# Patient Record
Sex: FEMALE | Race: BLACK OR AFRICAN AMERICAN | NOT HISPANIC OR LATINO | Employment: FULL TIME | URBAN - METROPOLITAN AREA
[De-identification: names, ages, dates, MRNs, and addresses within clinical notes are randomized per-mention and may not be internally consistent; named-entity substitution may affect disease eponyms.]

---

## 2022-07-28 PROBLEM — N13.5 URETERAL STRICTURE, RIGHT: Chronic | Status: ACTIVE | Noted: 2022-07-28

## 2022-07-28 PROBLEM — N13.30 HYDRONEPHROSIS: Chronic | Status: ACTIVE | Noted: 2022-07-28

## 2023-04-19 ENCOUNTER — PATIENT MESSAGE (OUTPATIENT)
Dept: RESEARCH | Facility: HOSPITAL | Age: 48
End: 2023-04-19

## 2023-11-01 PROBLEM — S39.012A STRAIN OF LUMBAR REGION: Status: ACTIVE | Noted: 2023-11-01

## 2023-11-01 PROBLEM — K59.03 DRUG-INDUCED CONSTIPATION: Status: ACTIVE | Noted: 2023-11-01

## 2023-11-01 PROBLEM — R73.03 PREDIABETES: Status: ACTIVE | Noted: 2023-11-01

## 2023-11-01 PROBLEM — E03.9 ACQUIRED HYPOTHYROIDISM: Status: ACTIVE | Noted: 2023-11-01

## 2023-11-01 PROBLEM — D72.829 LEUKOCYTOSIS: Status: ACTIVE | Noted: 2023-11-01

## 2023-11-01 PROBLEM — I21.4 NSTEMI (NON-ST ELEVATED MYOCARDIAL INFARCTION) (MULTI): Status: ACTIVE | Noted: 2023-11-01

## 2023-11-01 PROBLEM — K59.09 CHRONIC CONSTIPATION: Status: ACTIVE | Noted: 2023-11-01

## 2023-11-01 PROBLEM — F41.0 PANIC ATTACK: Status: ACTIVE | Noted: 2023-11-01

## 2023-11-01 PROBLEM — G43.909 MIGRAINE: Status: ACTIVE | Noted: 2023-11-01

## 2023-11-01 PROBLEM — M54.42 LUMBAGO WITH SCIATICA, LEFT SIDE: Status: ACTIVE | Noted: 2023-11-01

## 2023-11-01 PROBLEM — K21.9 GASTROESOPHAGEAL REFLUX DISEASE WITHOUT ESOPHAGITIS: Status: ACTIVE | Noted: 2023-11-01

## 2023-11-01 PROBLEM — I10 BENIGN ESSENTIAL HYPERTENSION: Status: ACTIVE | Noted: 2023-11-01

## 2023-11-01 PROBLEM — M54.41 LUMBAGO WITH SCIATICA, RIGHT SIDE: Status: ACTIVE | Noted: 2023-11-01

## 2023-11-01 PROBLEM — F17.200 TOBACCO USE DISORDER: Status: ACTIVE | Noted: 2023-11-01

## 2023-11-01 PROBLEM — F43.23 ADJUSTMENT DISORDER WITH MIXED ANXIETY AND DEPRESSED MOOD: Status: ACTIVE | Noted: 2023-11-01

## 2023-11-01 PROBLEM — F41.8 DEPRESSION WITH ANXIETY: Status: ACTIVE | Noted: 2023-11-01

## 2023-11-01 PROBLEM — E78.2 MIXED HYPERLIPIDEMIA: Status: ACTIVE | Noted: 2023-11-01

## 2023-11-01 PROBLEM — G47.33 OSA (OBSTRUCTIVE SLEEP APNEA): Status: ACTIVE | Noted: 2023-11-01

## 2023-11-01 PROBLEM — I20.9 ANGINA PECTORIS (CMS-HCC): Status: ACTIVE | Noted: 2023-11-01

## 2023-11-01 PROBLEM — G89.29 OTHER CHRONIC PAIN: Status: ACTIVE | Noted: 2023-11-01

## 2023-11-01 PROBLEM — R13.10 DYSPHAGIA: Status: ACTIVE | Noted: 2023-11-01

## 2023-11-01 PROBLEM — E87.1 HYPONATREMIA: Status: ACTIVE | Noted: 2023-11-01

## 2023-11-01 PROBLEM — G43.109 MIGRAINE WITH AURA AND WITHOUT STATUS MIGRAINOSUS, NOT INTRACTABLE: Status: ACTIVE | Noted: 2023-11-01

## 2023-11-01 PROBLEM — I25.10 CAD IN NATIVE ARTERY: Status: ACTIVE | Noted: 2023-11-01

## 2023-11-01 PROBLEM — Z98.890 HISTORY OF DISCECTOMY: Status: ACTIVE | Noted: 2023-11-01

## 2023-11-01 PROBLEM — Z95.5 STENTED CORONARY ARTERY: Status: ACTIVE | Noted: 2023-11-01

## 2023-11-01 RX ORDER — TOPIRAMATE 100 MG/1
1 TABLET, FILM COATED ORAL NIGHTLY
COMMUNITY
Start: 2020-11-19

## 2023-11-01 RX ORDER — NITROGLYCERIN 0.4 MG/1
TABLET SUBLINGUAL
COMMUNITY
End: 2023-11-02 | Stop reason: WASHOUT

## 2023-11-01 RX ORDER — METFORMIN HYDROCHLORIDE 500 MG/1
1 TABLET ORAL
COMMUNITY

## 2023-11-01 RX ORDER — OXYCODONE AND ACETAMINOPHEN 7.5; 325 MG/1; MG/1
1 TABLET ORAL EVERY 6 HOURS PRN
COMMUNITY
End: 2024-01-19 | Stop reason: WASHOUT

## 2023-11-01 RX ORDER — ISOSORBIDE MONONITRATE 30 MG/1
1 TABLET, EXTENDED RELEASE ORAL DAILY
COMMUNITY
End: 2023-11-02 | Stop reason: WASHOUT

## 2023-11-01 RX ORDER — OMEPRAZOLE 20 MG/1
1 TABLET, ORALLY DISINTEGRATING, DELAYED RELEASE ORAL
COMMUNITY

## 2023-11-01 RX ORDER — ONDANSETRON HYDROCHLORIDE 8 MG/1
1 TABLET, FILM COATED ORAL 3 TIMES DAILY PRN
COMMUNITY
Start: 2016-02-22 | End: 2023-11-02 | Stop reason: WASHOUT

## 2023-11-01 RX ORDER — GABAPENTIN 800 MG/1
1 TABLET ORAL 3 TIMES DAILY
COMMUNITY
End: 2023-12-01 | Stop reason: SDUPTHER

## 2023-11-01 RX ORDER — METOPROLOL SUCCINATE 25 MG/1
100 TABLET, EXTENDED RELEASE ORAL 2 TIMES DAILY
COMMUNITY
Start: 2016-03-07

## 2023-11-01 RX ORDER — SUMATRIPTAN 50 MG/1
TABLET, FILM COATED ORAL
COMMUNITY
Start: 2016-03-07 | End: 2023-11-02 | Stop reason: WASHOUT

## 2023-11-01 RX ORDER — BUSPIRONE HYDROCHLORIDE 10 MG/1
2 TABLET ORAL 2 TIMES DAILY
COMMUNITY
End: 2023-12-29

## 2023-11-01 RX ORDER — ATORVASTATIN CALCIUM 80 MG/1
1 TABLET, FILM COATED ORAL DAILY
COMMUNITY
End: 2024-02-21

## 2023-11-01 RX ORDER — LEVOTHYROXINE SODIUM 125 UG/1
1 TABLET ORAL
COMMUNITY
End: 2024-01-31

## 2023-11-01 RX ORDER — LISINOPRIL 20 MG/1
0.5 TABLET ORAL DAILY
COMMUNITY
Start: 2020-06-11 | End: 2024-02-08 | Stop reason: WASHOUT

## 2023-11-01 RX ORDER — TIZANIDINE 4 MG/1
1 TABLET ORAL 3 TIMES DAILY PRN
COMMUNITY

## 2023-11-01 RX ORDER — CYCLOBENZAPRINE HCL 10 MG
.5-1 TABLET ORAL NIGHTLY PRN
COMMUNITY
Start: 2016-02-22 | End: 2023-11-02 | Stop reason: WASHOUT

## 2023-11-02 ENCOUNTER — OFFICE VISIT (OUTPATIENT)
Dept: PRIMARY CARE | Facility: CLINIC | Age: 48
End: 2023-11-02
Payer: COMMERCIAL

## 2023-11-02 VITALS
BODY MASS INDEX: 35.7 KG/M2 | WEIGHT: 263.6 LBS | SYSTOLIC BLOOD PRESSURE: 126 MMHG | HEART RATE: 87 BPM | DIASTOLIC BLOOD PRESSURE: 80 MMHG | HEIGHT: 72 IN | OXYGEN SATURATION: 96 %

## 2023-11-02 DIAGNOSIS — B02.9 HERPES ZOSTER WITHOUT COMPLICATION: ICD-10-CM

## 2023-11-02 DIAGNOSIS — F41.9 ANXIETY: Primary | ICD-10-CM

## 2023-11-02 PROCEDURE — 3079F DIAST BP 80-89 MM HG: CPT | Performed by: STUDENT IN AN ORGANIZED HEALTH CARE EDUCATION/TRAINING PROGRAM

## 2023-11-02 PROCEDURE — 99214 OFFICE O/P EST MOD 30 MIN: CPT | Performed by: STUDENT IN AN ORGANIZED HEALTH CARE EDUCATION/TRAINING PROGRAM

## 2023-11-02 PROCEDURE — 4004F PT TOBACCO SCREEN RCVD TLK: CPT | Performed by: STUDENT IN AN ORGANIZED HEALTH CARE EDUCATION/TRAINING PROGRAM

## 2023-11-02 PROCEDURE — 99214 OFFICE O/P EST MOD 30 MIN: CPT | Mod: GC | Performed by: STUDENT IN AN ORGANIZED HEALTH CARE EDUCATION/TRAINING PROGRAM

## 2023-11-02 PROCEDURE — 3074F SYST BP LT 130 MM HG: CPT | Performed by: STUDENT IN AN ORGANIZED HEALTH CARE EDUCATION/TRAINING PROGRAM

## 2023-11-02 RX ORDER — TRAMADOL HYDROCHLORIDE 50 MG/1
50 TABLET ORAL EVERY 4 HOURS PRN
COMMUNITY
Start: 2020-07-08 | End: 2023-11-02 | Stop reason: WASHOUT

## 2023-11-02 RX ORDER — ESCITALOPRAM OXALATE 10 MG/1
10 TABLET ORAL DAILY
Qty: 30 TABLET | Refills: 5 | Status: SHIPPED | OUTPATIENT
Start: 2023-11-02 | End: 2023-11-02 | Stop reason: SDUPTHER

## 2023-11-02 RX ORDER — TICAGRELOR 90 MG/1
90 TABLET ORAL 2 TIMES DAILY
COMMUNITY
Start: 2023-01-13 | End: 2023-04-13

## 2023-11-02 RX ORDER — METHOCARBAMOL 750 MG/1
750 TABLET, FILM COATED ORAL 3 TIMES DAILY
COMMUNITY
Start: 2023-10-20 | End: 2024-02-08 | Stop reason: WASHOUT

## 2023-11-02 RX ORDER — VALACYCLOVIR HYDROCHLORIDE 1 G/1
1000 TABLET, FILM COATED ORAL 3 TIMES DAILY
Qty: 21 TABLET | Refills: 0 | Status: SHIPPED | OUTPATIENT
Start: 2023-11-02 | End: 2023-11-09

## 2023-11-02 RX ORDER — TRAMADOL HYDROCHLORIDE 100 MG/1
100 TABLET, COATED ORAL EVERY 4 HOURS PRN
COMMUNITY
End: 2023-11-02 | Stop reason: WASHOUT

## 2023-11-02 RX ORDER — ESCITALOPRAM OXALATE 10 MG/1
10 TABLET ORAL DAILY
Qty: 30 TABLET | Refills: 1 | Status: SHIPPED | OUTPATIENT
Start: 2023-11-02 | End: 2023-11-24

## 2023-11-02 RX ORDER — LISINOPRIL AND HYDROCHLOROTHIAZIDE 12.5; 2 MG/1; MG/1
1 TABLET ORAL DAILY
COMMUNITY
Start: 2023-09-18 | End: 2024-03-18 | Stop reason: SDUPTHER

## 2023-11-02 RX ORDER — HYDROXYZINE HYDROCHLORIDE 25 MG/1
25 TABLET, FILM COATED ORAL 3 TIMES DAILY
Qty: 90 TABLET | Refills: 0 | Status: SHIPPED | OUTPATIENT
Start: 2023-11-02 | End: 2023-11-24

## 2023-11-02 ASSESSMENT — PATIENT HEALTH QUESTIONNAIRE - PHQ9
1. LITTLE INTEREST OR PLEASURE IN DOING THINGS: NOT AT ALL
2. FEELING DOWN, DEPRESSED OR HOPELESS: NOT AT ALL
SUM OF ALL RESPONSES TO PHQ9 QUESTIONS 1 AND 2: 0

## 2023-11-02 ASSESSMENT — COLUMBIA-SUICIDE SEVERITY RATING SCALE - C-SSRS
2. HAVE YOU ACTUALLY HAD ANY THOUGHTS OF KILLING YOURSELF?: NO
1. IN THE PAST MONTH, HAVE YOU WISHED YOU WERE DEAD OR WISHED YOU COULD GO TO SLEEP AND NOT WAKE UP?: NO
6. HAVE YOU EVER DONE ANYTHING, STARTED TO DO ANYTHING, OR PREPARED TO DO ANYTHING TO END YOUR LIFE?: NO

## 2023-11-02 ASSESSMENT — PAIN SCALES - GENERAL: PAINLEVEL: 10-WORST PAIN EVER

## 2023-11-02 NOTE — PROGRESS NOTES
Subjective   Patient ID: Janae Roman is a 48 y.o. female who presents for Rash (Pt is here for a rash and pain, on her right shoulder blade, which started on Oct 20th. ) and Anxiety (Pt would like to get a quick acting anxiety medication ).    HPI  47 yo female here for shoulder anil and anxiety  Rash, pain on R posterior shoulder  Went to ED, there was no rash at that time  Shoulder was painful  Thought to be muscular pain  2. Anxiety  Has been going on for a while  Seems like it's getting worse lately  Is going through menopause    Review of Systems  All pertinent positive symptoms are included in the history of present illness.    All other systems have been reviewed and are negative and noncontributory to this patient's current ailments.     Allergies   Allergen Reactions    Corticosteroids (Glucocorticoids) Hives, Itching and Shortness of breath     Pt does not know which steroid she was taking.    Penicillins Shortness of breath    Hydrocodone-Acetaminophen Hives    Levofloxacin Unknown    Methylprednisolone Unknown    Morphine Hives    Promethazine Hives    Vancomycin Hcl Hives        Immunization History   Administered Date(s) Administered    Pf[employer] Purple Cap SARS-CoV-2 06/30/2021       Objective   Vitals:    11/02/23 0933   BP: 126/80   BP Location: Left arm   Patient Position: Sitting   BP Cuff Size: Large adult   Pulse: 87   SpO2: 96%   Weight: 120 kg (263 lb 9.6 oz)   Height: 1.829 m (6')       Physical Exam  CONSTITUTIONAL - cane use, well nourished, well developed, looks like stated age, in no acute distress, not ill-appearing, and not tired appearing  SKIN - R posterior shoulder: Erythematous .2cm lesions that do not cross midline  HEAD - no trauma, normocephalic  EYES - extraocular muscles are intact, and normal external exam  ENT - atraumatic  NECK - supple without rigidity, no neck mass was observed  CHEST - clear to auscultation, no wheezing, no crackles and no rales, good effort  CARDIAC -  regular rate and regular rhythm, no skipped beats, no murmur  ABDOMEN - no organomegaly, soft, nontender, nondistended, no guarding/rebound/rigidity, negative McBurney sign and negative Chan sign  EXTREMITIES - no edema, no deformities  NEUROLOGICAL - alert, oriented and no focal signs  PSYCHIATRIC - alert, pleasant and cordial, age-appropriate  IMMUNOLOGIC - no cervical lymphadenopathy     Assessment/Plan   47 yo female here for shoulder anil and anxiety  Shingles  ED note reviewed  Start Valacyclovir  2. Anxiety  Start escitalopram 10 mg daily  Start Hydroxyzine as needed    RTC in 1 month for anxiety follow up

## 2023-11-24 DIAGNOSIS — F41.9 ANXIETY: ICD-10-CM

## 2023-11-24 RX ORDER — ESCITALOPRAM OXALATE 10 MG/1
10 TABLET ORAL DAILY
Qty: 90 TABLET | Refills: 0 | Status: SHIPPED | OUTPATIENT
Start: 2023-11-24 | End: 2024-02-08 | Stop reason: WASHOUT

## 2023-11-24 RX ORDER — HYDROXYZINE HYDROCHLORIDE 25 MG/1
25 TABLET, FILM COATED ORAL 3 TIMES DAILY
Qty: 270 TABLET | Refills: 0 | Status: SHIPPED | OUTPATIENT
Start: 2023-11-24 | End: 2024-02-08 | Stop reason: WASHOUT

## 2023-12-01 RX ORDER — GABAPENTIN 800 MG/1
800 TABLET ORAL 3 TIMES DAILY
Qty: 90 TABLET | Refills: 3 | Status: SHIPPED | OUTPATIENT
Start: 2023-12-01 | End: 2024-02-08 | Stop reason: SDUPTHER

## 2023-12-28 DIAGNOSIS — F39 MOOD DISORDER (CMS-HCC): Primary | ICD-10-CM

## 2023-12-29 RX ORDER — BUSPIRONE HYDROCHLORIDE 10 MG/1
TABLET ORAL
Qty: 360 TABLET | Refills: 1 | Status: SHIPPED | OUTPATIENT
Start: 2023-12-29

## 2024-01-10 ENCOUNTER — APPOINTMENT (OUTPATIENT)
Dept: RADIOLOGY | Facility: HOSPITAL | Age: 49
End: 2024-01-10
Payer: COMMERCIAL

## 2024-01-10 ENCOUNTER — HOSPITAL ENCOUNTER (EMERGENCY)
Facility: HOSPITAL | Age: 49
Discharge: HOME | End: 2024-01-10
Attending: EMERGENCY MEDICINE
Payer: COMMERCIAL

## 2024-01-10 VITALS
TEMPERATURE: 98.2 F | HEIGHT: 72 IN | OXYGEN SATURATION: 96 % | DIASTOLIC BLOOD PRESSURE: 92 MMHG | HEART RATE: 116 BPM | RESPIRATION RATE: 18 BRPM | BODY MASS INDEX: 35.76 KG/M2 | WEIGHT: 264 LBS | SYSTOLIC BLOOD PRESSURE: 141 MMHG

## 2024-01-10 DIAGNOSIS — M54.41 ACUTE BILATERAL LOW BACK PAIN WITH BILATERAL SCIATICA: Primary | ICD-10-CM

## 2024-01-10 DIAGNOSIS — M54.42 ACUTE BILATERAL LOW BACK PAIN WITH BILATERAL SCIATICA: Primary | ICD-10-CM

## 2024-01-10 LAB
ALBUMIN SERPL-MCNC: 4.3 G/DL (ref 3.5–5)
ALP BLD-CCNC: 113 U/L (ref 35–125)
ALT SERPL-CCNC: 46 U/L (ref 5–40)
ANION GAP SERPL CALC-SCNC: 12 MMOL/L
APPEARANCE UR: CLEAR
AST SERPL-CCNC: 44 U/L (ref 5–40)
BASOPHILS # BLD MANUAL: 0.16 X10*3/UL (ref 0–0.1)
BASOPHILS NFR BLD MANUAL: 1 %
BILIRUB SERPL-MCNC: 0.2 MG/DL (ref 0.1–1.2)
BILIRUB UR STRIP.AUTO-MCNC: NEGATIVE MG/DL
BUN SERPL-MCNC: 35 MG/DL (ref 8–25)
CALCIUM SERPL-MCNC: 10.5 MG/DL (ref 8.5–10.4)
CHLORIDE SERPL-SCNC: 104 MMOL/L (ref 97–107)
CO2 SERPL-SCNC: 21 MMOL/L (ref 24–31)
COLOR UR: YELLOW
CREAT SERPL-MCNC: 1.2 MG/DL (ref 0.4–1.6)
EGFRCR SERPLBLD CKD-EPI 2021: 56 ML/MIN/1.73M*2
EOSINOPHIL # BLD MANUAL: 0.31 X10*3/UL (ref 0–0.7)
EOSINOPHIL NFR BLD MANUAL: 2 %
ERYTHROCYTE [DISTWIDTH] IN BLOOD BY AUTOMATED COUNT: 13.2 % (ref 11.5–14.5)
GLUCOSE SERPL-MCNC: 121 MG/DL (ref 65–99)
GLUCOSE UR STRIP.AUTO-MCNC: NORMAL MG/DL
HCT VFR BLD AUTO: 43.7 % (ref 36–46)
HGB BLD-MCNC: 15 G/DL (ref 12–16)
HYALINE CASTS #/AREA URNS AUTO: ABNORMAL /LPF
IMM GRANULOCYTES # BLD AUTO: 0.16 X10*3/UL (ref 0–0.7)
IMM GRANULOCYTES NFR BLD AUTO: 1 % (ref 0–0.9)
KETONES UR STRIP.AUTO-MCNC: NEGATIVE MG/DL
LEUKOCYTE ESTERASE UR QL STRIP.AUTO: NEGATIVE
LYMPHOCYTES # BLD MANUAL: 4.55 X10*3/UL (ref 1.2–4.8)
LYMPHOCYTES NFR BLD MANUAL: 29 %
MCH RBC QN AUTO: 34.5 PG (ref 26–34)
MCHC RBC AUTO-ENTMCNC: 34.3 G/DL (ref 32–36)
MCV RBC AUTO: 101 FL (ref 80–100)
MONOCYTES # BLD MANUAL: 0.79 X10*3/UL (ref 0.1–1)
MONOCYTES NFR BLD MANUAL: 5 %
MUCOUS THREADS #/AREA URNS AUTO: ABNORMAL /LPF
NEUTS SEG # BLD MANUAL: 9.73 X10*3/UL (ref 1.2–7)
NEUTS SEG NFR BLD MANUAL: 62 %
NITRITE UR QL STRIP.AUTO: NEGATIVE
NRBC BLD-RTO: 0 /100 WBCS (ref 0–0)
PH UR STRIP.AUTO: 5.5 [PH]
PLATELET # BLD AUTO: 421 X10*3/UL (ref 150–450)
POTASSIUM SERPL-SCNC: 4.1 MMOL/L (ref 3.4–5.1)
PROT SERPL-MCNC: 8.4 G/DL (ref 5.9–7.9)
PROT UR STRIP.AUTO-MCNC: ABNORMAL MG/DL
RBC # BLD AUTO: 4.35 X10*6/UL (ref 4–5.2)
RBC # UR STRIP.AUTO: NEGATIVE /UL
RBC #/AREA URNS AUTO: ABNORMAL /HPF
RBC MORPH BLD: ABNORMAL
SODIUM SERPL-SCNC: 137 MMOL/L (ref 133–145)
SP GR UR STRIP.AUTO: 1.04
SQUAMOUS #/AREA URNS AUTO: ABNORMAL /HPF
TOTAL CELLS COUNTED BLD: 100
UROBILINOGEN UR STRIP.AUTO-MCNC: NORMAL MG/DL
VARIANT LYMPHS # BLD MANUAL: 0.16 X10*3/UL (ref 0–0.5)
VARIANT LYMPHS NFR BLD: 1 %
WBC # BLD AUTO: 15.7 X10*3/UL (ref 4.4–11.3)
WBC #/AREA URNS AUTO: ABNORMAL /HPF

## 2024-01-10 PROCEDURE — 85007 BL SMEAR W/DIFF WBC COUNT: CPT | Performed by: PHYSICIAN ASSISTANT

## 2024-01-10 PROCEDURE — 85027 COMPLETE CBC AUTOMATED: CPT | Performed by: PHYSICIAN ASSISTANT

## 2024-01-10 PROCEDURE — 80053 COMPREHEN METABOLIC PANEL: CPT | Performed by: PHYSICIAN ASSISTANT

## 2024-01-10 PROCEDURE — 2550000001 HC RX 255 CONTRASTS: Performed by: PHYSICIAN ASSISTANT

## 2024-01-10 PROCEDURE — 72158 MRI LUMBAR SPINE W/O & W/DYE: CPT

## 2024-01-10 PROCEDURE — 96375 TX/PRO/DX INJ NEW DRUG ADDON: CPT | Mod: 59

## 2024-01-10 PROCEDURE — 36415 COLL VENOUS BLD VENIPUNCTURE: CPT | Performed by: PHYSICIAN ASSISTANT

## 2024-01-10 PROCEDURE — 81001 URINALYSIS AUTO W/SCOPE: CPT | Performed by: PHYSICIAN ASSISTANT

## 2024-01-10 PROCEDURE — 96374 THER/PROPH/DIAG INJ IV PUSH: CPT | Mod: 59

## 2024-01-10 PROCEDURE — 2500000004 HC RX 250 GENERAL PHARMACY W/ HCPCS (ALT 636 FOR OP/ED)

## 2024-01-10 PROCEDURE — 99284 EMERGENCY DEPT VISIT MOD MDM: CPT | Performed by: EMERGENCY MEDICINE

## 2024-01-10 PROCEDURE — A9575 INJ GADOTERATE MEGLUMI 0.1ML: HCPCS | Performed by: PHYSICIAN ASSISTANT

## 2024-01-10 RX ORDER — DEXAMETHASONE SODIUM PHOSPHATE 100 MG/10ML
10 INJECTION INTRAMUSCULAR; INTRAVENOUS ONCE
Status: COMPLETED | OUTPATIENT
Start: 2024-01-10 | End: 2024-01-10

## 2024-01-10 RX ORDER — HYDROMORPHONE HYDROCHLORIDE 1 MG/ML
1 INJECTION, SOLUTION INTRAMUSCULAR; INTRAVENOUS; SUBCUTANEOUS ONCE
Status: COMPLETED | OUTPATIENT
Start: 2024-01-10 | End: 2024-01-10

## 2024-01-10 RX ORDER — DIPHENHYDRAMINE HYDROCHLORIDE 50 MG/ML
50 INJECTION INTRAMUSCULAR; INTRAVENOUS ONCE
Status: COMPLETED | OUTPATIENT
Start: 2024-01-10 | End: 2024-01-10

## 2024-01-10 RX ORDER — GADOTERATE MEGLUMINE 376.9 MG/ML
20 INJECTION INTRAVENOUS
Status: COMPLETED | OUTPATIENT
Start: 2024-01-10 | End: 2024-01-10

## 2024-01-10 RX ADMIN — DIPHENHYDRAMINE HYDROCHLORIDE 50 MG: 50 INJECTION, SOLUTION INTRAMUSCULAR; INTRAVENOUS at 15:42

## 2024-01-10 RX ADMIN — HYDROMORPHONE HYDROCHLORIDE 1 MG: 1 INJECTION, SOLUTION INTRAMUSCULAR; INTRAVENOUS; SUBCUTANEOUS at 15:48

## 2024-01-10 RX ADMIN — GADOTERATE MEGLUMINE 20 ML: 376.9 INJECTION INTRAVENOUS at 13:25

## 2024-01-10 RX ADMIN — DEXAMETHASONE SODIUM PHOSPHATE 10 MG: 10 INJECTION INTRAMUSCULAR; INTRAVENOUS at 15:48

## 2024-01-10 ASSESSMENT — COLUMBIA-SUICIDE SEVERITY RATING SCALE - C-SSRS
1. IN THE PAST MONTH, HAVE YOU WISHED YOU WERE DEAD OR WISHED YOU COULD GO TO SLEEP AND NOT WAKE UP?: NO
2. HAVE YOU ACTUALLY HAD ANY THOUGHTS OF KILLING YOURSELF?: NO
6. HAVE YOU EVER DONE ANYTHING, STARTED TO DO ANYTHING, OR PREPARED TO DO ANYTHING TO END YOUR LIFE?: NO

## 2024-01-10 ASSESSMENT — PAIN DESCRIPTION - LOCATION: LOCATION: LEG

## 2024-01-10 ASSESSMENT — PAIN - FUNCTIONAL ASSESSMENT: PAIN_FUNCTIONAL_ASSESSMENT: 0-10

## 2024-01-10 ASSESSMENT — PAIN DESCRIPTION - FREQUENCY: FREQUENCY: CONSTANT/CONTINUOUS

## 2024-01-10 ASSESSMENT — PAIN DESCRIPTION - PAIN TYPE: TYPE: ACUTE PAIN

## 2024-01-10 ASSESSMENT — PAIN DESCRIPTION - ORIENTATION: ORIENTATION: RIGHT

## 2024-01-10 ASSESSMENT — PAIN DESCRIPTION - PROGRESSION: CLINICAL_PROGRESSION: NOT CHANGED

## 2024-01-10 ASSESSMENT — PAIN DESCRIPTION - ONSET: ONSET: SUDDEN

## 2024-01-10 ASSESSMENT — PAIN SCALES - GENERAL: PAINLEVEL_OUTOF10: 10 - WORST POSSIBLE PAIN

## 2024-01-10 ASSESSMENT — PAIN DESCRIPTION - DESCRIPTORS: DESCRIPTORS: BURNING;SHARP;STABBING

## 2024-01-10 NOTE — ED TRIAGE NOTES
Triage Note:  Date of Encounter: [unfilled]   MRN: [id number]    I saw the patient as the clinician in triage and performed a brief history and physical exam established acuity and order appropriate test to develop basic plan of care.  Patient will be seen by ERI and/or the physician who will independently evaluate  The patient.  Please see subsequent provider notes for further details and disposition      Brief HPI:   In brief, patient is 48-year-old female here for acute on chronic low back pain, has had for 5 lower back surgeries in the past, says around 4 to 5 weeks ago had spinal injections that did not seem to help as well for nerve pain.  Says that now she is having increasing pain, has increasing neuropathy pain in her legs bilaterally, says that her pain is uncontrolled her primary concern however is she had 2 episodes of stool incontinence one being this morning and the other being 2 days ago.  She says that this is a new finding for her.  She has had some urinary incontinence in the past.  No new injury or trauma    Focused physical exam:  I evaluated patient in a wheelchair in a triage room, she has sensation in lower extremities but full evaluation could not be performed in the setting in which I evaluated.  Patient is speaking clearly, appears mildly uncomfortable.  No sign of injury or trauma.  No respiratory distress    Plan/MDM:  Basic laboratory studies, urinalysis, and MRI with and without contrast to assess for the potential of spinal epidural abscess related to the recent spinal injections as well as MRI to assess for cauda equina with the bowel incontinence.  Patient tells me that she has had MRIs many times and has not had an issue with them and the hardware that she already has is MRI friendly.        Please see subsequent provider note for details and disposition

## 2024-01-10 NOTE — ED PROVIDER NOTES
This patient was seen by the advanced practice provider.  I have personally performed a substantial portion of the encounter.  I have seen and examined the patient; agree with the workup, evaluation, MDM, management and diagnosis.  The care plan has been discussed.    I personally saw the patient and made/approve the management plan and take responsibility for the patient management.    History:  This is a 48-year-old female with a history of chronic back pain who presents to the emergency department with worsening lower back pain over the past 48 hours.  Patient did have some bowel /bladder incontinence complaints.  Exam:  Patient has sensation to the bilateral lower extremities equal tendon reflexes bilaterally strength is limited by lower back pain however strength is equal bilaterally.  MDM:  The case was discussed with ED PA and I agree with his workup.  Will reach out to her pain management physician to loop him and on her lab and MRI results.  MRI showed no evidence of epidural abscess or other acute spinal cord pathology.  The plan will be to give the patient Dilaudid IV, Zofran IV and 10 of Decadron IV along with Benadryl IV as patient reports having reaction to 2 steroids that requires Benadryl in the past.  Plan will be to discharge the patient with a pain management follow-up tomorrow.     Ari Hair, DO  01/10/24 8983       Ari Hair, DO  01/10/24 0164

## 2024-01-10 NOTE — ED PROVIDER NOTES
HPI   Chief Complaint   Patient presents with    Back Pain     I have been having lower back pain and it goes down my rt leg the pain is sharp stabbing and constant and I hAVE burning pain in my back this has been going on for 2 days       HPI  Patient is a 48-year-old female with chronic back pain who presents to ED today for worsening back pain over the last 2 days.  Patient is followed by pain management.  She denies any recent injury or aggravating factor.  She reports spontaneous worsening pain in the back radiating down the legs as well as neuropathy in the foot, worse on the right.  She also reports 2-3 episodes of stool incontinence which is new for her.  She also reports urinary incontinence which is not new for her.  Patient takes gabapentin 800 mg p.o. 3 times daily and Percocet 7.5 mg for chronic pain.                  [place] Coma Scale Score: 15                  Patient History   Past Medical History:   Diagnosis Date    Personal history of other complications of pregnancy, childbirth and the puerperium     History of spontaneous     Personal history of urinary calculi 2016    History of renal calculi     Past Surgical History:   Procedure Laterality Date    LITHOTRIPSY  2016    Renal Lithotripsy     Family History   Problem Relation Name Age of Onset    Other (bypass) Father      Heart disease Brother      Breast cancer Daughter          dion    Breast cancer Daughter       Social History     Tobacco Use    Smoking status: Some Days     Packs/day: .5     Types: Cigarettes    Smokeless tobacco: Never   Substance Use Topics    Alcohol use: Never    Drug use: Never       Physical Exam   ED Triage Vitals [01/10/24 1147]   Temp Heart Rate Resp BP   36.8 °C (98.2 °F) (!) 120 18 (!) 141/92      SpO2 Temp Source Heart Rate Source Patient Position   96 % Oral Monitor Sitting      BP Location FiO2 (%)     Left arm --       Physical Exam  Constitutional:       Comments: Patient is sitting in  chair crying   HENT:      Head: Normocephalic and atraumatic.   Eyes:      Extraocular Movements: Extraocular movements intact.   Cardiovascular:      Rate and Rhythm: Normal rate and regular rhythm.   Pulmonary:      Effort: Pulmonary effort is normal.      Breath sounds: Normal breath sounds.   Abdominal:      Palpations: Abdomen is soft.   Musculoskeletal:         General: Normal range of motion.      Cervical back: Neck supple.   Skin:     General: Skin is warm and dry.   Neurological:      Mental Status: She is alert and oriented to person, place, and time.   Psychiatric:         Mood and Affect: Mood normal.         Behavior: Behavior normal.         ED Course & MDM   Diagnoses as of 01/10/24 2331   Acute bilateral low back pain with bilateral sciatica       Medical Decision Making  Parts of this chart have been completed using voice recognition software. Please excuse any errors of transcription.  My thought process and reason for plan has been formulated from the time that I saw the patient until the time of disposition and is not specific to one specific moment during their visit and furthermore my MDM encompasses this entire chart and not only this text box.      HPI: Detailed above.    Exam: A medically appropriate exam performed, outlined above, given the known history and presentation.    History obtained from: Patient, EMR    EKG: None    Social Determinants of Health considered during this visit: Discussed    Medications given during visit:  Medications   dexAMETHasone (Decadron) injection 10 mg (has no administration in time range)   HYDROmorphone (Dilaudid) injection 1 mg (has no administration in time range)   diphenhydrAMINE (BENADryl) injection 50 mg (has no administration in time range)   gadoterate meglumine (Dotarem) 0.5 mmol/mL contrast injection 20 mL (20 mL intravenous Given 1/10/24 1325)        Diagnostic/tests  Labs Reviewed   CBC WITH AUTO DIFFERENTIAL - Abnormal       Result Value     WBC 15.7 (*)     nRBC 0.0      RBC 4.35      Hemoglobin 15.0      Hematocrit 43.7       (*)     MCH 34.5 (*)     MCHC 34.3      RDW 13.2      Platelets 421      Immature Granulocytes %, Automated 1.0 (*)     Immature Granulocytes Absolute, Automated 0.16      Narrative:     The previously reported component Neutrophils % is no longer being reported.  The previously reported component Lymphocytes % is no longer being reported.  The previously reported component Monocytes % is no longer being reported.  The previously   reported component Eosinophils % is no longer being reported.  The previously reported component Basophils % is no longer being reported.  The previously reported component Absolute Neutrophils is no longer being reported.  The previously reported   component Absolute Lymphocytes is no longer being reported.  The previously reported component Absolute Monocytes is no longer being reported.  The previously reported component Absolute Eosinophils is no longer being reported.  The previously reported   component Absolute Basophils is no longer being reported.   COMPREHENSIVE METABOLIC PANEL - Abnormal    Glucose 121 (*)     Sodium 137      Potassium 4.1      Chloride 104      Bicarbonate 21 (*)     Urea Nitrogen 35 (*)     Creatinine 1.20      eGFR 56 (*)     Calcium 10.5 (*)     Albumin 4.3      Alkaline Phosphatase 113      Total Protein 8.4 (*)     AST 44 (*)     Bilirubin, Total 0.2      ALT 46 (*)     Anion Gap 12     URINALYSIS WITH REFLEX CULTURE AND MICROSCOPIC - Abnormal    Color, Urine Yellow      Appearance, Urine Clear      Specific Gravity, Urine 1.039 (*)     pH, Urine 5.5      Protein, Urine 20 (TRACE)      Glucose, Urine Normal      Blood, Urine NEGATIVE      Ketones, Urine NEGATIVE      Bilirubin, Urine NEGATIVE      Urobilinogen, Urine Normal      Nitrite, Urine NEGATIVE      Leukocyte Esterase, Urine NEGATIVE     MANUAL DIFFERENTIAL - Abnormal    Neutrophils %, Manual 62.0       Lymphocytes %, Manual 29.0      Monocytes %, Manual 5.0      Eosinophils %, Manual 2.0      Basophils %, Manual 1.0      Atypical Lymphocytes %, Manual 1.0      Seg Neutrophils Absolute, Manual 9.73 (*)     Lymphocytes Absolute, Manual 4.55      Monocytes Absolute, Manual 0.79      Eosinophils Absolute, Manual 0.31      Basophils Absolute, Manual 0.16 (*)     Atypical Lymphs Absolute, Manual 0.16      Total Cells Counted 100      RBC Morphology No significant RBC morphology present     URINALYSIS MICROSCOPIC WITH REFLEX CULTURE - Abnormal    WBC, Urine 1-5      RBC, Urine 1-2      Squamous Epithelial Cells, Urine 1-9 (SPARSE)      Mucus, Urine FEW      Hyaline Casts, Urine OCCASIONAL (*)    URINALYSIS WITH REFLEX CULTURE AND MICROSCOPIC    Narrative:     The following orders were created for panel order Urinalysis with Reflex Culture and Microscopic.  Procedure                               Abnormality         Status                     ---------                               -----------         ------                     Urinalysis with Reflex C...[[id number]]  Abnormal            Final result               Extra Urine Gray Tube[[id number]]                                                         Please view results for these tests on the individual orders.   EXTRA URINE GRAY TUBE      MR lumbar spine w and wo IV contrast   Final Result   Stable appearing examination from 04/23/2023.        Stable laminectomy and posterior fusion at L5-S1. No abnormal   enhancement nor enhancing lesions.        Stable degenerative changes at L4-5 and L5-S1.        MACRO:   None        Signed by: Lj Fisher 1/10/2024 2:05 PM   Dictation workstation:   [id number]           Considerations/further MDM:    Patient is a 48-year-old female with chronic back pain who presents to ED today for worsening back pain over the last 2 days.  Patient is followed by pain management.  She denies any recent injury or aggravating factor.  She  reports spontaneous worsening pain in the back radiating down the legs as well as neuropathy in the foot, worse on the right.  She also reports 2-3 episodes of stool incontinence which is new for her.  She also reports urinary incontinence which is not new for her.  Patient takes gabapentin 800 mg p.o. 3 times daily and Percocet 7.5 mg for chronic pain.  WBC is 15.7, labs otherwise unremarkable.  MRI of lumbar spine shows no acute findings.  Patient has appointment with pain management tomorrow.  Will medicate for pain with Dilaudid, Benadryl, and Decadron.  Physical exam is unremarkable.  Patient is hemodynamically stable.  Patient will follow-up with pain management for definitive treatment.  We have discussed the diagnosis and risks, and we agree with discharging home to follow-up with appropriate physician as directed. We also discussed returning to the Emergency Department immediately if new or worsening symptoms occur. We have discussed the symptoms which are most concerning that necessitate immediate return. Pt symptoms have been well controlled here and the patient is safe for discharge with appropriate outpatient follow up. The patient has verbalized understanding to return to ER without delay for new or worsening pains or for any other symptoms or concerns. I estimate there is a low risk for abdominal aortic aneurysm, cauda equine syndrome, epidural mass lesion, severe/acute spinal stenosis requiring urgent or emergent intervention, herniated disk causing severe stenosis (requiring urgent intervention, or spinal nerve compression syndrome.  Thus I consider the discharge disposition to be reasonable.  We have discussed the diagnosis and risks, and agree with discharging the home to follow up on outpatient basis as directed.  Also discussed returning to Emergency department immediately if any new or worsening symptoms occur.  We have discussed the symptoms which are most concerning to be saddle anesthesia,  urinary or bowel incontinence, retention, or dysfunction, changing or worsening pain or any other symptoms or concerns, of which,  should return without delay to ER.  Due to physical exam findings, the patient is NOT presenting with new radicular symptoms, a change in any type of chronic pains, saddle anesthesia, or any change in urinary or bowel function/dysfunction, retention or incontinence.  In addition their motor, sensory and reflex examination is intact showing no acute findings, no new focal neuro defects.      Procedure  Procedures     Martir Solis PA-C  01/10/24 1536       Ari Hair DO  01/10/24 0166

## 2024-01-19 ENCOUNTER — TELEPHONE (OUTPATIENT)
Dept: PRIMARY CARE | Facility: CLINIC | Age: 49
End: 2024-01-19
Payer: COMMERCIAL

## 2024-01-19 DIAGNOSIS — F41.9 ANXIETY: ICD-10-CM

## 2024-01-19 RX ORDER — OXYCODONE AND ACETAMINOPHEN 10; 325 MG/1; MG/1
1 TABLET ORAL EVERY 6 HOURS PRN
COMMUNITY
Start: 2024-01-13

## 2024-01-23 ENCOUNTER — APPOINTMENT (OUTPATIENT)
Dept: RADIOLOGY | Facility: HOSPITAL | Age: 49
End: 2024-01-23
Payer: COMMERCIAL

## 2024-01-23 ENCOUNTER — HOSPITAL ENCOUNTER (EMERGENCY)
Facility: HOSPITAL | Age: 49
Discharge: HOME | End: 2024-01-23
Attending: EMERGENCY MEDICINE
Payer: COMMERCIAL

## 2024-01-23 VITALS
TEMPERATURE: 98.8 F | HEART RATE: 81 BPM | RESPIRATION RATE: 16 BRPM | HEIGHT: 72 IN | DIASTOLIC BLOOD PRESSURE: 74 MMHG | WEIGHT: 260 LBS | SYSTOLIC BLOOD PRESSURE: 133 MMHG | BODY MASS INDEX: 35.21 KG/M2 | OXYGEN SATURATION: 97 %

## 2024-01-23 DIAGNOSIS — M54.50 ACUTE LOW BACK PAIN, UNSPECIFIED BACK PAIN LATERALITY, UNSPECIFIED WHETHER SCIATICA PRESENT: ICD-10-CM

## 2024-01-23 DIAGNOSIS — S09.90XA HEAD INJURY, INITIAL ENCOUNTER: ICD-10-CM

## 2024-01-23 DIAGNOSIS — M25.561 ACUTE PAIN OF RIGHT KNEE: ICD-10-CM
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
## 2024-01-23 PROCEDURE — 2500000005 HC RX 250 GENERAL PHARMACY W/O HCPCS: Performed by: EMERGENCY MEDICINE

## 2024-01-23 PROCEDURE — 72100 X-RAY EXAM L-S SPINE 2/3 VWS: CPT

## 2024-01-23 PROCEDURE — 73564 X-RAY EXAM KNEE 4 OR MORE: CPT | Mod: RT

## 2024-01-23 PROCEDURE — 70450 CT HEAD/BRAIN W/O DYE: CPT

## 2024-01-23 PROCEDURE — 99285 EMERGENCY DEPT VISIT HI MDM: CPT | Performed by: EMERGENCY MEDICINE

## 2024-01-23 PROCEDURE — 72125 CT NECK SPINE W/O DYE: CPT

## 2024-01-23 RX ORDER — LIDOCAINE 50 MG/G
1 PATCH TOPICAL DAILY
Qty: 5 PATCH | Refills: 0 | Status: SHIPPED | OUTPATIENT
Start: 2024-01-23 | End: 2024-01-28

## 2024-01-23 RX ORDER — IBUPROFEN 600 MG/1
600 TABLET ORAL ONCE
Status: DISCONTINUED | OUTPATIENT
Start: 2024-01-23 | End: 2024-01-23 | Stop reason: HOSPADM

## 2024-01-23 RX ORDER — LIDOCAINE 560 MG/1
1 PATCH PERCUTANEOUS; TOPICAL; TRANSDERMAL DAILY
Status: DISCONTINUED | OUTPATIENT
Start: 2024-01-23 | End: 2024-01-23 | Stop reason: HOSPADM

## 2024-01-23 RX ADMIN — LIDOCAINE 1 PATCH: 4 PATCH TOPICAL at 08:51

## 2024-01-23 ASSESSMENT — PAIN DESCRIPTION - PAIN TYPE: TYPE: ACUTE PAIN

## 2024-01-23 ASSESSMENT — PAIN SCALES - GENERAL: PAINLEVEL_OUTOF10: 8

## 2024-01-23 ASSESSMENT — PAIN DESCRIPTION - PROGRESSION: CLINICAL_PROGRESSION: NOT CHANGED

## 2024-01-23 ASSESSMENT — PAIN DESCRIPTION - LOCATION: LOCATION: GENERALIZED

## 2024-01-23 ASSESSMENT — PAIN - FUNCTIONAL ASSESSMENT: PAIN_FUNCTIONAL_ASSESSMENT: 0-10

## 2024-01-23 NOTE — Clinical Note
Janae Roman was seen and treated in our emergency department on 1/23/2024.  She may return to work on 01/23/2024.       If you have any questions or concerns, please don't hesitate to call.      Makayla Terry MD

## 2024-01-23 NOTE — ED PROVIDER NOTES
HPI   Chief Complaint   Patient presents with    Fall     Fall on Friday, bilateral knees, right shoulder and head hurt, pain management md sent patient here to be seen. Patient ambulates with cane without issue, denies loc, does not take blood thinners        HPI                    [place] Coma Scale Score: 15                  Patient History   Past Medical History:   Diagnosis Date    Personal history of other complications of pregnancy, childbirth and the puerperium     History of spontaneous     Personal history of urinary calculi 2016    History of renal calculi     Past Surgical History:   Procedure Laterality Date    LITHOTRIPSY  2016    Renal Lithotripsy     Family History   Problem Relation Name Age of Onset    Other (bypass) Father      Heart disease Brother      Breast cancer Daughter          dion    Breast cancer Daughter       Social History     Tobacco Use    Smoking status: Some Days     Packs/day: .5     Types: Cigarettes    Smokeless tobacco: Never   Substance Use Topics    Alcohol use: Never    Drug use: Never       Physical Exam   ED Triage Vitals [24 0838]   Temp Heart Rate Respirations BP   37.1 °C (98.8 °F) 94 18 139/75      Pulse Ox Temp Source Heart Rate Source Patient Position   95 % Oral Monitor --      BP Location FiO2 (%)     -- --       Physical Exam    ED Course & MDM   Diagnoses as of 24 1536   Fall, initial encounter   Head injury, initial encounter   Acute low back pain, unspecified back pain laterality, unspecified whether sciatica present   Acute pain of right knee       Medical Decision Making    The patient is a 25-year-old female presenting to the emergency department, reportedly at the direction of her pain management specialist, to be evaluated for a fall.  The patient states that she fell out of bed 5 days ago.  She states that she fell onto her knees and then hit her head on the dresser.  She did not have a loss of consciousness.  She states  that she has a headache and posterior neck pain.  She also has right knee pain.  She states that she does have a history of chronic low back pain and her pain management doctor wanted her to get an x-ray of her back as well as of the other areas that were injured.  She states that she primarily has pain in the posterior neck and her right knee. no visual changes.  No focal weakness or numbness.  No chest pain or shortness of breath.  No abdominal pain.  No nausea vomiting.  No diarrhea or constipation.  No urinary complaints.  No use of blood thinners.  No urinary retention.  No bowel or bladder incontinence.  All pertinent positives and negatives are recorded above.  All other systems reviewed and otherwise negative.  Vital signs within normal limits.  Physical exam with a well-nourished well-developed female in no acute distress.  HEENT exam within normal limits.  She has no evidence of airway compromise or respiratory distress.  Abdominal exam is benign.  She has no gross motor, neurologic or vascular deficits on exam.  She does not any visible or palpable bony deformities.  Abdominal exam is benign.      Oral ibuprofen and Lidoderm patch ordered.      XR lumbar spine 2-3 views   Final Result   No evidence for acute fracture or spondylolisthesis        Signed by: Lj Fisher 1/23/2024 11:01 AM   Dictation workstation:   [id number]      XR knee right 4+ views   Final Result   No evidence for acute osseous abnormality.        Signed by: Lj Fisher 1/23/2024 11:03 AM   Dictation workstation:   [id number]      CT head wo IV contrast   Final Result   No CT evidence for acute intracranial pathology.        Signed by: Lj Fisher 1/23/2024 11:08 AM   Dictation workstation:   [id number]      CT cervical spine wo IV contrast   Final Result   No acute fracture or spondylolisthesis.        Signed by: Lj Fisher 1/23/2024 11:16 AM   Dictation workstation:   [id number]           The patient does  not have any evidence of fracture or dislocation on diagnostic imaging.  She does not have any acute neurologic deficits on exam.  No visible or palpable bony deformity.      The patient was released in good condition.  She will follow-up with her primary care physician and her pain management specialist within 1 to 2 days for further management of her current symptoms.  Rx given for Lidoderm patches.        Impression/diagnosis  Fall from bed, initial encounter  Closed head injury  Cervical strain  Right knee pain/contusion  Chronic pain syndrome      I reviewed the results of the diagnostic imaging.  Formal radiology reading was completed by the radiologist.    Procedure  Procedures     Makayla Terry MD  01/23/24 1140       Makayla Terry MD  01/23/24 4443

## 2024-01-23 NOTE — DISCHARGE INSTRUCTIONS
Follow-up with your primary care physician and your pain management specialist within 1 to 2 days for further management of your current symptoms and further management of your pain.      Return to the emergency department sooner with worsening of symptoms or onset of new symptoms

## 2024-01-31 DIAGNOSIS — E03.9 ACQUIRED HYPOTHYROIDISM: Primary | ICD-10-CM

## 2024-01-31 RX ORDER — LEVOTHYROXINE SODIUM 125 UG/1
TABLET ORAL
Qty: 90 TABLET | Refills: 1 | Status: SHIPPED | OUTPATIENT
Start: 2024-01-31

## 2024-02-05 ASSESSMENT — ENCOUNTER SYMPTOMS
ABDOMINAL PAIN: 0
FATIGUE: 0
SHORTNESS OF BREATH: 0
FEVER: 0

## 2024-02-05 NOTE — PROGRESS NOTES
Subjective   Patient ID: Janae Roman is a 48 y.o. female who presents for Thyroid Problem (Pt is here to discuss thyroid being painful and swelling, for about a few weeks / nr/Pt also has been having difficulty swallowing liquids for about a year  ).    HPI   Few weeks ago, fell out of bed, landed on knees. Hit shoulder + neck on nightstand. Went to ED at recommendation of pain management, workup unremarkable.    Hypothyroidism - managed w/125mcg. Overdue for labs - Last TSH 6/2022 3.09. has pain in the area of the thyroid - ongoing x 1mo. went to urgent care and was told she had a thyroid infection - tx w/steroids + abx. Slight improvement. Pain is intermittent. Causing difficulty swallowing solids + liquids.    Anxiety - controlled on buspar. Was started on lexapro but no longer taking (made her feel weird).    EMILIE - had procedure in the hospital and was told to get home sleep study.    CKD - 1/2024 eGFR 56. (10/2023 eGFR 43).    DM - 6.0 (6/2022) --> 6.6. controlled on metformin.    Hx MI - 2 years ago. No longer wishes to see cardiology. Will manage here.    chronic back pain - Recent MRI w/no evidence of cord compression, stable laminectomy, stable degenerative changes. Managed by pain management (Dr. Ayala) - percocet, gabapentin, nerve blocks PRN. Had gabapentin recently increased to 800mg 4x/day, due for refills - having a hard time reaching pain management.    *recent CMP w/elevated LFTs + Ca. Will repeat.    Review of Systems   Constitutional:  Negative for fatigue and fever.   Respiratory:  Negative for shortness of breath.    Cardiovascular:  Negative for chest pain.   Gastrointestinal:  Negative for abdominal pain.   Skin:  Negative for rash.       Objective   /80   Pulse 100   Wt 118 kg (260 lb 9.6 oz)   SpO2 98%   BMI 35.34 kg/m²     Physical Exam  Constitutional:       Appearance: Normal appearance.   HENT:      Head: Normocephalic and atraumatic.   Eyes:      Extraocular Movements:  Extraocular movements intact.      Conjunctiva/sclera: Conjunctivae normal.   Neck:      Thyroid: Thyromegaly and thyroid tenderness present.   Cardiovascular:      Rate and Rhythm: Normal rate and regular rhythm.      Heart sounds: Normal heart sounds.   Pulmonary:      Effort: Pulmonary effort is normal.      Breath sounds: Normal breath sounds. No wheezing or rhonchi.   Musculoskeletal:      Cervical back: Normal range of motion and neck supple.   Skin:     General: Skin is warm.      Findings: No rash.   Neurological:      General: No focal deficit present.      Mental Status: She is alert.         Assessment/Plan   Problem List Items Addressed This Visit             ICD-10-CM    Acquired hypothyroidism E03.9    Relevant Orders    TSH with reflex to Free T4 if abnormal    US thyroid    Mixed hyperlipidemia E78.2    Relevant Orders    Lipid Panel    EMILIE (obstructive sleep apnea) G47.33    Relevant Orders    In-Center Sleep Study (Non-Sleep Provider Only)     Other Visit Diagnoses         Codes    Elevated LFTs    -  Primary R79.89    Relevant Orders    Comprehensive Metabolic Panel    Stage 3a chronic kidney disease (CMS/HCC)     N18.31    Relevant Orders    Comprehensive Metabolic Panel    Type 2 diabetes mellitus without complication, without long-term current use of insulin (CMS/HCC)     E11.9    Relevant Orders    POCT glycosylated hemoglobin (Hb A1C) manually resulted    History of MI (myocardial infarction)     I25.2    Relevant Medications    nitroglycerin (Nitrodur) 0.2 mg/hr patch    nitroglycerin (Nitrostat) 0.4 mg SL tablet

## 2024-02-08 ENCOUNTER — OFFICE VISIT (OUTPATIENT)
Dept: PRIMARY CARE | Facility: CLINIC | Age: 49
End: 2024-02-08
Payer: COMMERCIAL

## 2024-02-08 VITALS
BODY MASS INDEX: 35.34 KG/M2 | HEART RATE: 100 BPM | SYSTOLIC BLOOD PRESSURE: 124 MMHG | WEIGHT: 260.6 LBS | DIASTOLIC BLOOD PRESSURE: 80 MMHG | OXYGEN SATURATION: 98 %

## 2024-02-08 DIAGNOSIS — G47.33 OSA (OBSTRUCTIVE SLEEP APNEA): ICD-10-CM

## 2024-02-08 DIAGNOSIS — E11.9 TYPE 2 DIABETES MELLITUS WITHOUT COMPLICATION, WITHOUT LONG-TERM CURRENT USE OF INSULIN (MULTI): ICD-10-CM

## 2024-02-08 DIAGNOSIS — E78.2 MIXED HYPERLIPIDEMIA: ICD-10-CM

## 2024-02-08 DIAGNOSIS — F41.9 ANXIETY: ICD-10-CM

## 2024-02-08 DIAGNOSIS — N18.31 STAGE 3A CHRONIC KIDNEY DISEASE (MULTI): ICD-10-CM

## 2024-02-08 DIAGNOSIS — I25.2 HISTORY OF MI (MYOCARDIAL INFARCTION): ICD-10-CM

## 2024-02-08 DIAGNOSIS — E03.9 ACQUIRED HYPOTHYROIDISM: ICD-10-CM

## 2024-02-08 DIAGNOSIS — R79.89 ELEVATED LFTS: Primary | ICD-10-CM

## 2024-02-08 LAB — POC HEMOGLOBIN A1C: 6.6 % (ref 4.2–6.5)

## 2024-02-08 PROCEDURE — 4004F PT TOBACCO SCREEN RCVD TLK: CPT | Performed by: PHYSICIAN ASSISTANT

## 2024-02-08 PROCEDURE — 3079F DIAST BP 80-89 MM HG: CPT | Performed by: PHYSICIAN ASSISTANT

## 2024-02-08 PROCEDURE — 83036 HEMOGLOBIN GLYCOSYLATED A1C: CPT | Mod: QW,91 | Performed by: PHYSICIAN ASSISTANT

## 2024-02-08 PROCEDURE — 3074F SYST BP LT 130 MM HG: CPT | Performed by: PHYSICIAN ASSISTANT

## 2024-02-08 PROCEDURE — 99214 OFFICE O/P EST MOD 30 MIN: CPT | Performed by: PHYSICIAN ASSISTANT

## 2024-02-08 RX ORDER — NITROGLYCERIN 0.4 MG/1
0.4 TABLET SUBLINGUAL EVERY 5 MIN PRN
COMMUNITY

## 2024-02-08 RX ORDER — NITROGLYCERIN 40 MG/1
1 PATCH TRANSDERMAL DAILY
COMMUNITY
End: 2024-02-21

## 2024-02-08 RX ORDER — GABAPENTIN 800 MG/1
800 TABLET ORAL 4 TIMES DAILY
Qty: 120 TABLET | Refills: 5 | Status: SHIPPED | OUTPATIENT
Start: 2024-02-08 | End: 2024-03-09

## 2024-02-08 ASSESSMENT — COLUMBIA-SUICIDE SEVERITY RATING SCALE - C-SSRS
1. IN THE PAST MONTH, HAVE YOU WISHED YOU WERE DEAD OR WISHED YOU COULD GO TO SLEEP AND NOT WAKE UP?: NO
6. HAVE YOU EVER DONE ANYTHING, STARTED TO DO ANYTHING, OR PREPARED TO DO ANYTHING TO END YOUR LIFE?: NO
2. HAVE YOU ACTUALLY HAD ANY THOUGHTS OF KILLING YOURSELF?: NO

## 2024-02-08 ASSESSMENT — PAIN SCALES - GENERAL: PAINLEVEL: 8

## 2024-02-08 ASSESSMENT — PATIENT HEALTH QUESTIONNAIRE - PHQ9
1. LITTLE INTEREST OR PLEASURE IN DOING THINGS: NOT AT ALL
SUM OF ALL RESPONSES TO PHQ9 QUESTIONS 1 AND 2: 0
2. FEELING DOWN, DEPRESSED OR HOPELESS: NOT AT ALL

## 2024-02-08 NOTE — TELEPHONE ENCOUNTER
Pt called requesting refill  on gabapentin as she only has one pill left and Dr. Ayala will not refill it for her, as Dr. Lombardi has been refilling it. Please send new script to pharmacy. Pt was seen today.

## 2024-02-15 ENCOUNTER — HOSPITAL ENCOUNTER (OUTPATIENT)
Dept: RADIOLOGY | Facility: HOSPITAL | Age: 49
Discharge: HOME | End: 2024-02-15
Payer: COMMERCIAL

## 2024-02-15 DIAGNOSIS — E03.9 ACQUIRED HYPOTHYROIDISM: Primary | ICD-10-CM

## 2024-02-15 DIAGNOSIS — E03.9 ACQUIRED HYPOTHYROIDISM: ICD-10-CM

## 2024-02-15 PROCEDURE — 76536 US EXAM OF HEAD AND NECK: CPT

## 2024-02-21 DIAGNOSIS — E78.5 HYPERLIPIDEMIA, UNSPECIFIED: ICD-10-CM

## 2024-02-21 DIAGNOSIS — I25.2 HISTORY OF MI (MYOCARDIAL INFARCTION): ICD-10-CM

## 2024-02-21 RX ORDER — ATORVASTATIN CALCIUM 80 MG/1
80 TABLET, FILM COATED ORAL DAILY
Qty: 90 TABLET | Refills: 1 | Status: SHIPPED | OUTPATIENT
Start: 2024-02-21 | End: 2024-08-19

## 2024-02-21 RX ORDER — NITROGLYCERIN 40 MG/1
PATCH TRANSDERMAL
Qty: 30 PATCH | Refills: 1 | Status: SHIPPED | OUTPATIENT
Start: 2024-02-21 | End: 2024-04-24 | Stop reason: SDUPTHER

## 2024-02-22 ENCOUNTER — LAB (OUTPATIENT)
Dept: LAB | Facility: LAB | Age: 49
End: 2024-02-22
Payer: COMMERCIAL

## 2024-02-22 DIAGNOSIS — E78.2 MIXED HYPERLIPIDEMIA: ICD-10-CM

## 2024-02-22 DIAGNOSIS — R79.89 ELEVATED LFTS: ICD-10-CM

## 2024-02-22 DIAGNOSIS — E03.9 ACQUIRED HYPOTHYROIDISM: ICD-10-CM

## 2024-02-22 DIAGNOSIS — N18.31 STAGE 3A CHRONIC KIDNEY DISEASE (MULTI): ICD-10-CM

## 2024-02-22 LAB
ALBUMIN SERPL-MCNC: 4.4 G/DL (ref 3.5–5)
ALP BLD-CCNC: 121 U/L (ref 35–125)
ALT SERPL-CCNC: 54 U/L (ref 5–40)
ANION GAP SERPL CALC-SCNC: 17 MMOL/L
AST SERPL-CCNC: 37 U/L (ref 5–40)
BILIRUB SERPL-MCNC: <0.2 MG/DL (ref 0.1–1.2)
BUN SERPL-MCNC: 24 MG/DL (ref 8–25)
CALCIUM SERPL-MCNC: 10.2 MG/DL (ref 8.5–10.4)
CHLORIDE SERPL-SCNC: 99 MMOL/L (ref 97–107)
CHOLEST SERPL-MCNC: 235 MG/DL (ref 133–200)
CHOLEST/HDLC SERPL: 6.7 {RATIO}
CO2 SERPL-SCNC: 23 MMOL/L (ref 24–31)
CREAT SERPL-MCNC: 1 MG/DL (ref 0.4–1.6)
EGFRCR SERPLBLD CKD-EPI 2021: 69 ML/MIN/1.73M*2
GLUCOSE SERPL-MCNC: 92 MG/DL (ref 65–99)
HDLC SERPL-MCNC: 35 MG/DL
LDLC SERPL CALC-MCNC: ABNORMAL MG/DL
POTASSIUM SERPL-SCNC: 4.5 MMOL/L (ref 3.4–5.1)
PROT SERPL-MCNC: 7.4 G/DL (ref 5.9–7.9)
SODIUM SERPL-SCNC: 139 MMOL/L (ref 133–145)
T4 FREE SERPL-MCNC: 1.5 NG/DL (ref 0.9–1.7)
THYROPEROXIDASE AB SERPL-ACNC: >1000 IU/ML
TRIGL SERPL-MCNC: 487 MG/DL (ref 40–150)
TSH SERPL DL<=0.05 MIU/L-ACNC: 7.85 MIU/L (ref 0.27–4.2)

## 2024-02-22 PROCEDURE — 86800 THYROGLOBULIN ANTIBODY: CPT

## 2024-02-22 PROCEDURE — 86376 MICROSOMAL ANTIBODY EACH: CPT

## 2024-02-22 PROCEDURE — 36415 COLL VENOUS BLD VENIPUNCTURE: CPT

## 2024-02-22 PROCEDURE — 80053 COMPREHEN METABOLIC PANEL: CPT

## 2024-02-22 PROCEDURE — 84443 ASSAY THYROID STIM HORMONE: CPT

## 2024-02-22 PROCEDURE — 80061 LIPID PANEL: CPT

## 2024-02-22 PROCEDURE — 84439 ASSAY OF FREE THYROXINE: CPT

## 2024-02-24 LAB — THYROGLOB AB SERPL-ACNC: 1.8 IU/ML (ref 0–4)

## 2024-02-28 ENCOUNTER — TELEPHONE (OUTPATIENT)
Dept: PRIMARY CARE | Facility: CLINIC | Age: 49
End: 2024-02-28
Payer: COMMERCIAL

## 2024-02-28 DIAGNOSIS — E06.3 HASHIMOTO'S THYROIDITIS: Primary | ICD-10-CM

## 2024-02-28 NOTE — TELEPHONE ENCOUNTER
Left voice message for pt in regards to her lab results. Mago would like to know If pt is taking biotin? Also, to  confirm she is still taking her atorvastatin.

## 2024-02-28 NOTE — TELEPHONE ENCOUNTER
The pt called back I gave lab results, pt states she is not taking biotin and states she forgets to take atorvastatin so she has been missing doses

## 2024-02-29 NOTE — TELEPHONE ENCOUNTER
The pt called back I gave information she verbally understood. Pt states she drinks a small redbull every morning and she stated she saw it contains Vitamin B she wants to know if you believe this could be the cause of elevated Vitamin B

## 2024-02-29 NOTE — TELEPHONE ENCOUNTER
Okay I want to recheck her thyroid one more time - I don't trust the last result. Make sure she's not taking any vitamin B 2 weeks before her labs. Please also encourage her to take her atorvastatin regularly because she is at high risk for another heart attack.

## 2024-03-04 ENCOUNTER — TELEPHONE (OUTPATIENT)
Dept: PRIMARY CARE | Facility: CLINIC | Age: 49
End: 2024-03-04
Payer: COMMERCIAL

## 2024-03-04 DIAGNOSIS — L30.9 DERMATITIS: Primary | ICD-10-CM

## 2024-03-04 RX ORDER — CLINDAMYCIN HYDROCHLORIDE 300 MG/1
300 CAPSULE ORAL 3 TIMES DAILY
Qty: 30 CAPSULE | Refills: 0 | Status: SHIPPED | OUTPATIENT
Start: 2024-03-04 | End: 2024-03-14

## 2024-03-04 NOTE — TELEPHONE ENCOUNTER
Pt called stating she is having a flare up of her skin condition with getting blisters all over and requesting ABX clindamycin to help.

## 2024-03-04 NOTE — TELEPHONE ENCOUNTER
Rx sent. Please let her know this antibiotic can increase risk for c. Diff which is a bacterial intestinal infection. Recommend she take probiotics while on this medication, and let us know if she develops any significant diarrhea.

## 2024-03-08 ENCOUNTER — HOSPITAL ENCOUNTER (EMERGENCY)
Facility: HOSPITAL | Age: 49
Discharge: HOME | End: 2024-03-08
Attending: STUDENT IN AN ORGANIZED HEALTH CARE EDUCATION/TRAINING PROGRAM
Payer: COMMERCIAL

## 2024-03-08 VITALS
OXYGEN SATURATION: 97 % | HEIGHT: 72 IN | RESPIRATION RATE: 17 BRPM | BODY MASS INDEX: 35.21 KG/M2 | TEMPERATURE: 98.1 F | DIASTOLIC BLOOD PRESSURE: 109 MMHG | WEIGHT: 260 LBS | HEART RATE: 81 BPM | SYSTOLIC BLOOD PRESSURE: 134 MMHG

## 2024-03-08 DIAGNOSIS — G89.29 CHRONIC LOW BACK PAIN, UNSPECIFIED BACK PAIN LATERALITY, UNSPECIFIED WHETHER SCIATICA PRESENT: Primary | ICD-10-CM

## 2024-03-08 DIAGNOSIS — M54.50 CHRONIC LOW BACK PAIN, UNSPECIFIED BACK PAIN LATERALITY, UNSPECIFIED WHETHER SCIATICA PRESENT: Primary | ICD-10-CM

## 2024-03-08 PROCEDURE — 99283 EMERGENCY DEPT VISIT LOW MDM: CPT

## 2024-03-08 PROCEDURE — 2500000001 HC RX 250 WO HCPCS SELF ADMINISTERED DRUGS (ALT 637 FOR MEDICARE OP): Performed by: STUDENT IN AN ORGANIZED HEALTH CARE EDUCATION/TRAINING PROGRAM

## 2024-03-08 PROCEDURE — 2500000004 HC RX 250 GENERAL PHARMACY W/ HCPCS (ALT 636 FOR OP/ED): Performed by: STUDENT IN AN ORGANIZED HEALTH CARE EDUCATION/TRAINING PROGRAM

## 2024-03-08 PROCEDURE — 96372 THER/PROPH/DIAG INJ SC/IM: CPT

## 2024-03-08 RX ORDER — KETOROLAC TROMETHAMINE 30 MG/ML
30 INJECTION, SOLUTION INTRAMUSCULAR; INTRAVENOUS ONCE
Status: COMPLETED | OUTPATIENT
Start: 2024-03-08 | End: 2024-03-08

## 2024-03-08 RX ORDER — PREDNISONE 10 MG/1
50 TABLET ORAL ONCE
Status: COMPLETED | OUTPATIENT
Start: 2024-03-08 | End: 2024-03-08

## 2024-03-08 RX ORDER — PREDNISONE 20 MG/1
20 TABLET ORAL DAILY
Qty: 3 TABLET | Refills: 0 | Status: SHIPPED | OUTPATIENT
Start: 2024-03-08 | End: 2024-03-11

## 2024-03-08 RX ORDER — DIPHENHYDRAMINE HCL 50 MG
50 CAPSULE ORAL ONCE
Status: COMPLETED | OUTPATIENT
Start: 2024-03-08 | End: 2024-03-08

## 2024-03-08 RX ORDER — ORPHENADRINE CITRATE 30 MG/ML
60 INJECTION INTRAMUSCULAR; INTRAVENOUS ONCE
Status: COMPLETED | OUTPATIENT
Start: 2024-03-08 | End: 2024-03-08

## 2024-03-08 RX ADMIN — DIPHENHYDRAMINE HYDROCHLORIDE 50 MG: 50 CAPSULE ORAL at 06:36

## 2024-03-08 RX ADMIN — PREDNISONE 50 MG: 10 TABLET ORAL at 06:36

## 2024-03-08 RX ADMIN — KETOROLAC TROMETHAMINE 30 MG: 30 INJECTION INTRAMUSCULAR; INTRAVENOUS at 06:39

## 2024-03-08 RX ADMIN — ORPHENADRINE CITRATE 60 MG: 60 INJECTION INTRAMUSCULAR; INTRAVENOUS at 06:37

## 2024-03-08 ASSESSMENT — COLUMBIA-SUICIDE SEVERITY RATING SCALE - C-SSRS
6. HAVE YOU EVER DONE ANYTHING, STARTED TO DO ANYTHING, OR PREPARED TO DO ANYTHING TO END YOUR LIFE?: NO
2. HAVE YOU ACTUALLY HAD ANY THOUGHTS OF KILLING YOURSELF?: NO
1. IN THE PAST MONTH, HAVE YOU WISHED YOU WERE DEAD OR WISHED YOU COULD GO TO SLEEP AND NOT WAKE UP?: NO

## 2024-03-08 ASSESSMENT — LIFESTYLE VARIABLES
EVER FELT BAD OR GUILTY ABOUT YOUR DRINKING: NO
HAVE PEOPLE ANNOYED YOU BY CRITICIZING YOUR DRINKING: NO
HAVE YOU EVER FELT YOU SHOULD CUT DOWN ON YOUR DRINKING: NO
EVER HAD A DRINK FIRST THING IN THE MORNING TO STEADY YOUR NERVES TO GET RID OF A HANGOVER: NO

## 2024-03-08 ASSESSMENT — PAIN - FUNCTIONAL ASSESSMENT: PAIN_FUNCTIONAL_ASSESSMENT: 0-10

## 2024-03-08 ASSESSMENT — PAIN DESCRIPTION - LOCATION: LOCATION: BACK

## 2024-03-08 ASSESSMENT — PAIN DESCRIPTION - DESCRIPTORS: DESCRIPTORS: ACHING

## 2024-03-08 ASSESSMENT — PAIN DESCRIPTION - PAIN TYPE: TYPE: CHRONIC PAIN

## 2024-03-08 ASSESSMENT — PAIN SCALES - GENERAL: PAINLEVEL_OUTOF10: 10 - WORST POSSIBLE PAIN

## 2024-03-08 ASSESSMENT — PAIN DESCRIPTION - ORIENTATION: ORIENTATION: LEFT;RIGHT

## 2024-03-08 NOTE — ED PROVIDER NOTES
HPI   Chief Complaint   Patient presents with    Back Pain       Patient presents with low back pain.  It radiates down both legs but particularly the right.  Patient has had this pain for years but she states that she has been doing too much at home recently, standing up to cook food.  She is in pain management and takes 7.5 mg Percocet tablets daily as well as gabapentin 800 mg.  She reports no new loss of control of bowels or bladder although did have 1 episode several months ago.  He states that it hurts when she ambulates but has been doing so.                          Gertrude Coma Scale Score: 15                     Patient History   Past Medical History:   Diagnosis Date    Personal history of other complications of pregnancy, childbirth and the puerperium     History of spontaneous     Personal history of urinary calculi 2016    History of renal calculi     Past Surgical History:   Procedure Laterality Date    LITHOTRIPSY  2016    Renal Lithotripsy     Family History   Problem Relation Name Age of Onset    Other (bypass) Father      Heart disease Brother      Breast cancer Daughter          dion    Breast cancer Daughter       Social History     Tobacco Use    Smoking status: Some Days     Packs/day: .5     Types: Cigarettes    Smokeless tobacco: Never   Substance Use Topics    Alcohol use: Never    Drug use: Never       Physical Exam   ED Triage Vitals   Temperature Heart Rate Respirations BP   24 0541 24 0541 24 0541 24 0545   36.7 °C (98.1 °F) (!) 115 20 115/68      Pulse Ox Temp Source Heart Rate Source Patient Position   24 0541 24 0541 -- --   95 % Oral        BP Location FiO2 (%)     -- --             Physical Exam  HENT:      Head: Normocephalic.   Eyes:      General: No scleral icterus.  Pulmonary:      Effort: Pulmonary effort is normal.   Neurological:      Mental Status: She is alert.      Comments: Patient awake, alert, answers questions  appropriately.  Able to move legs bilaterally.         ED Course & MDM   Diagnoses as of 03/08/24 0905   Chronic low back pain, unspecified back pain laterality, unspecified whether sciatica present       Medical Decision Making  Given chronic nature of pain and no new injuries, my suspicion for cauda equina syndrome, epidural abscess, epidural hematoma, spinal cord compression is very low.  Did have recent MRI which revealed no acute findings.  Patient was given muscle relaxer, Toradol, and steroid.  She reports that she continues to have pain in the back.  Patient was advised to follow-up with primary care physician as well as spine specialist.  Patient is already on chronic opiates.  I feel that further opiate administration in the emergency department is unlikely to be helpful in the long-term.  Parts of this chart were completed with dictation software, please excuse any errors in transcription.        Procedure  Procedures     Brandyn Pryor MD  03/08/24 0906

## 2024-03-08 NOTE — DISCHARGE INSTRUCTIONS
You should follow-up with your primary care physician as well as spine specialist.  Return to the emergency department with any worsening symptoms.    It is important to remember that your care does not end here and you must continue to monitor your condition closely. Please return to the emergency department for any worsening or concerning signs or symptoms as directed by our conversations and the discharge instructions. If you do not have a doctor please contact the referral number on your discharge instructions. Please contact any physician specialists provided in your discharge notes as it is very important to follow up with them regarding your condition. If you are unable to reach the physicians provided, please come back to the Emergency Department at any time.    Return to emergency room without delay for ANY new or worsening pains or for any other symptoms or concerns.  Return with worsening pains, nausea, vomiting, trouble breathing, palpitations, shortness of breath, inability to pass stool or urine, loss of control of stool or urine, any numbness or tingling (that is not normal for you), uncontrolled fevers, the passing of blood or other material in stool or urine, rashes, pains or for any other symptoms or concerns you may have.  You are always welcome to return to the ER at any time for any reason or for any other concerns you may have.

## 2024-03-18 DIAGNOSIS — R00.0 TACHYCARDIA, UNSPECIFIED: ICD-10-CM

## 2024-03-18 DIAGNOSIS — I10 BENIGN ESSENTIAL HYPERTENSION: Primary | ICD-10-CM

## 2024-03-19 RX ORDER — LISINOPRIL AND HYDROCHLOROTHIAZIDE 12.5; 2 MG/1; MG/1
TABLET ORAL
Qty: 45 TABLET | Refills: 1 | OUTPATIENT
Start: 2024-03-19

## 2024-03-19 RX ORDER — LISINOPRIL AND HYDROCHLOROTHIAZIDE 12.5; 2 MG/1; MG/1
1 TABLET ORAL DAILY
Qty: 90 TABLET | Refills: 3 | Status: SHIPPED | OUTPATIENT
Start: 2024-03-19 | End: 2025-03-19

## 2024-04-08 ENCOUNTER — HOSPITAL ENCOUNTER (OUTPATIENT)
Dept: RADIOLOGY | Facility: HOSPITAL | Age: 49
Discharge: HOME | End: 2024-04-08
Payer: COMMERCIAL

## 2024-04-08 DIAGNOSIS — F33.0 MAJOR DEPRESSIVE DISORDER, RECURRENT, MILD (CMS-HCC): ICD-10-CM

## 2024-04-08 DIAGNOSIS — S39.012A STRAIN OF MUSCLE, FASCIA AND TENDON OF LOWER BACK, INITIAL ENCOUNTER: ICD-10-CM

## 2024-04-08 DIAGNOSIS — F41.1 GENERALIZED ANXIETY DISORDER: ICD-10-CM

## 2024-04-08 DIAGNOSIS — M51.27 OTHER INTERVERTEBRAL DISC DISPLACEMENT, LUMBOSACRAL REGION: ICD-10-CM

## 2024-04-08 PROCEDURE — 72114 X-RAY EXAM L-S SPINE BENDING: CPT

## 2024-04-08 PROCEDURE — 72114 X-RAY EXAM L-S SPINE BENDING: CPT | Performed by: RADIOLOGY

## 2024-04-24 DIAGNOSIS — I25.2 HISTORY OF MI (MYOCARDIAL INFARCTION): ICD-10-CM

## 2024-04-24 RX ORDER — NITROGLYCERIN 40 MG/1
1 PATCH TRANSDERMAL EVERY 12 HOURS
Qty: 90 PATCH | Refills: 1 | Status: SHIPPED | OUTPATIENT
Start: 2024-04-24

## 2024-05-31 ENCOUNTER — EVALUATION (OUTPATIENT)
Dept: PHYSICAL THERAPY | Facility: CLINIC | Age: 49
End: 2024-05-31
Payer: COMMERCIAL

## 2024-05-31 DIAGNOSIS — M51.27 OTHER INTERVERTEBRAL DISC DISPLACEMENT, LUMBOSACRAL REGION: ICD-10-CM

## 2024-05-31 PROCEDURE — 97162 PT EVAL MOD COMPLEX 30 MIN: CPT | Mod: GP

## 2024-05-31 ASSESSMENT — PAIN SCALES - GENERAL: PAINLEVEL_OUTOF10: 8

## 2024-05-31 ASSESSMENT — PAIN - FUNCTIONAL ASSESSMENT: PAIN_FUNCTIONAL_ASSESSMENT: 0-10

## 2024-05-31 NOTE — LETTER
June 2, 2024    Antonio Giron DO  [address] [place] [address]    Patient: Janae Roman   YOB: 1975   Date of Visit: 5/31/2024       Dear Antonio Giron DO  [address] [place] [address]    The attached plan of care is being sent to you because your patient’s medical reimbursement requires that you certify the plan of care. Your signature is required to allow uninterrupted insurance coverage.      You may indicate your approval by signing below and faxing this form back to us at No information on file..    Please call No information on file. with any questions or concerns.    Thank you for this referral,        Nikita Khan, PT  [facility]  [address] [place] [address]    Payer: Payor: [employer] / Plan: [employer] / Product Type: *No Product type* /                                                                         Date:     Dear Nikita Khan PT,     Re: Ms. Janae Roman, MRN:[id number]    I certify that I have reviewed the attached plan of care and it is medically necessary for Ms. Janae Roman (1975) who is under my care.          ______________________________________                    _________________  Provider name and credentials                                           Date and time                                                                                           Plan of Care 5/31/24   Effective from: 5/31/2024  Effective to: 8/29/2024    Plan ID: [id number]            Participants as of Finalize on 6/2/2024    Name Type Comments Contact Info    Antonio Giron DO Referring Provider Thank you for the referral. [phone]    Nikita Khan, PT Physical Therapist  [phone]       Last Plan Note     Author: Nikita Khan PT Status: Sign when Signing Visit Last edited: 5/31/2024  8:45 AM           Physical Therapy Evaluation    Patient Name: Janae Roman  MRN:  [id number]  Evaluation Date: 5/31/2024  Time Calculation  Start Time: 0845  Stop Time: 0925  Time Calculation (min): 40 min  PT Evaluation Time Entry  PT Evaluation (Moderate) Time Entry: 40          Problem List Items Addressed This Visit             ICD-10-CM    Other intervertebral disc displacement, lumbosacral region M51.27    Relevant Orders    Follow Up In Physical Therapy         Subjective   General:  General  Reason for Referral: Other invertebral disc displacement, LBp, LE pain, impaired core and LE strengthening.  Referred By: Dr. Kyler Giron, DO, [facility] patient  Past Medical History Relevant to Rehab: 49 y/o F previously known to this clinic for treatment for LBP.  Presents with continued LBP and feels overall activity level is reduced.  Does falls or events.  Denies trauma.  Reports burning paraesthesia's in LE's, limited trunk motion and standing endurance.  [facility] patient with RX for aquatics and goal to improve strength and reduce pain.  General Comment: Patient prefers to do aquatics    Patient reported hx of condition: H/O lumbar surgery, disc degeneration, displacement      Surgery:   No recent surgical intervention    Precautions:   Back pain hx, h/o lumbar surgery, NO TRACTION    Relevant PMH:  Lumbar surgery in past, Fibromyalgia    Red flags: No    Pain:  Pain Assessment: 0-10  Pain Score: 8  Pain Type: Chronic pain  Pain Location: Back  Pain Orientation: Right, Left  Pain Radiating Towards: Radicular pain/paraesthesias in to LE's  Patient's Stated Pain Goal: 5  Home Living:  Home Living Comment: Has assist from son due to movement difficulties    Prior Function Per Pt/Caregiver Report:  Level of [place]: Needs assistance with ADLs, Needs assistance with homemaking  Receives Help From: Other (Comment) (Son)  Ambulatory Assistance: Independent ([place] PALM, 4WW)    Imaging:  MRI lumbar spine:    IMPRESSION:  Stable appearing examination from 04/23/2023.      Stable laminectomy and posterior  fusion at L5-S1. No abnormal  enhancement nor enhancing lesions.      Stable degenerative changes at L4-5 and L5-S1.        OBJECTIVE:  Objective   Posture:  Posture Comment: Flexed posturing  Range of Motion:  Range of Motion Comments: Trunk flexion AROM impairment 50%, 75% extension, rotation WFL, SLR painful  Strength:  Strength Comments: 2+ to 3-/5 trunk and proximal LE's.  3 to 3+/5 MMT distal quad/hamstrings, ankle DF  Flexibility:  Flexibility Comment: Tight posterior chain  Palpation:  Palpation Comment: Diffuse tenderness lumbar paraspinals, gluteals, hamstrings  Special Tests:  Special Tests Comment: +Neural tension flexion test, + SLR B/L  Gait:  Gait Comment: Guarded, leans heavily to R via RUE cane, decreased B/L LE step/stride length.  Fair balance.  Denies falls  Balance:  Balance Comment: Fair; requires cane, delayed righting reactions to pain.  Stairs:  Stairs Comment: N/A  Bed Mobility:  Bed Mobility Comment: Independent, requires increased time  Transfers:  Transfers Comment: Independent, requires increased time  Other:  Comment: Diffuse, scattered paraesthesias B/L LE's.  Light touch intact.  Tone WFL.  Standing endurance < 2 minutes before exacerbation of LBP.  Gross motor control WFL but bradykinetic.    Outcome Measures:        Assessment  PT Assessment Results: Decreased strength, Decreased range of motion, Decreased endurance, Impaired balance, Decreased mobility, Decreased coordination, Impaired sensation  Rehab Prognosis: Good  Evaluation/Treatment Tolerance: Patient limited by pain  Assessment Comment: Impaired functional mobility/activity tolerance with moderate to severe general weakness of trunk/LE's due to h/o advanced lumbar arthritis, degeneration    Pt is a 48 y.o. female who presents with impairments of LBP, LE radicular pain, paraesthesia and weakness. These impairments have led to functional limitations including impaired posture, mobility, standing endurance. Pt would benefit  from skilled physical therapy intervention to improve above impairments and facilitate return to function.    Plan  Treatment/Interventions: Aquatic therapy, Dry needling, Education/ Instruction, Electrical stimulation, Gait training, Manual therapy, Neuromuscular re-education, Orthosis fit/ training, Prosthetic management/ training, Self care/ home management, Taping techniques, Therapeutic activities, Therapeutic exercises, Ultrasound  PT Plan: Skilled PT  PT Frequency: 2 times per week  Onset Date: 05/31/24  Certification Period End Date: 07/31/24  Number of Treatments Authorized: 12 visits (including evaluation)  Rehab Potential: Good  Plan of Care Agreement: Patient    Plan for next visit: Start aquatics, focus on deep end decompression with exercise/hanging.     OP EDUCATION:  Outpatient Education  Individual(s) Educated: Patient  Education Provided: POC  Diagnosis and Precautions: LBP secondary lumbar disc displacement  Risk and Benefits Discussed with Patient/Caregiver/Other: yes  Patient/Caregiver Demonstrated Understanding: yes  Plan of Care Discussed and Agreed Upon: yes  Patient Response to Education: Patient/Caregiver Verbalized Understanding of Information    Today's Treatment:  Evaluation and discussion only today  HEP to be completed daily, exercises include:  To be developed    Goals:  Active       Outpatient PT goals       Short Term Goals       Start:  05/31/24    Expected End:  07/15/24       STG 1: Patient will be IND with basic core/hip strengthening HEP x 5 visits.  STG 2: Patient will report 10% reduction in overall back pain symptoms x 5-6 visits.          Long Term Goals       Start:  05/31/24    Expected End:  08/29/24       LTG 1: Patient will report < 60% impairment Oswestry pain scale.  LTG 2: Patient will report LBP < 7/10 x 2 weeks for improved activity tolerance  LTG 3: Patient will report 10 minute standing endurance with no exacerbation of LBP  LTG 4: Patient will be independent  with seated shower/bathing/sponge bath as sign of improved endurance from completing physical therapy.                                 Current Participants as of 6/2/2024    Name Type Comments Contact Info    Antonio Giron, DO Referring Provider Thank you for the referral. [phone]    Signature pending    Nikita Khan, PT Physical Therapist  [phone]    Signature pending

## 2024-06-02 NOTE — PROGRESS NOTES
Physical Therapy Evaluation    Patient Name: Janae Roman  MRN: [id number]  Evaluation Date: 5/31/2024  Time Calculation  Start Time: 0845  Stop Time: 0925  Time Calculation (min): 40 min  PT Evaluation Time Entry  PT Evaluation (Moderate) Time Entry: 40          Problem List Items Addressed This Visit             ICD-10-CM    Other intervertebral disc displacement, lumbosacral region M51.27    Relevant Orders    Follow Up In Physical Therapy         Subjective   General:  General  Reason for Referral: Other invertebral disc displacement, LBp, LE pain, impaired core and LE strengthening.  Referred By: Dr. Kyler Giron, DO, [facility] patient  Past Medical History Relevant to Rehab: 47 y/o F previously known to this clinic for treatment for LBP.  Presents with continued LBP and feels overall activity level is reduced.  Does falls or events.  Denies trauma.  Reports burning paraesthesia's in LE's, limited trunk motion and standing endurance.  [facility] patient with RX for aquatics and goal to improve strength and reduce pain.  General Comment: Patient prefers to do aquatics    Patient reported hx of condition: H/O lumbar surgery, disc degeneration, displacement      Surgery:   No recent surgical intervention    Precautions:   Back pain hx, h/o lumbar surgery, NO TRACTION    Relevant PMH:  Lumbar surgery in past, Fibromyalgia    Red flags: No    Pain:  Pain Assessment: 0-10  Pain Score: 8  Pain Type: Chronic pain  Pain Location: Back  Pain Orientation: Right, Left  Pain Radiating Towards: Radicular pain/paraesthesias in to LE's  Patient's Stated Pain Goal: 5  Home Living:  Home Living Comment: Has assist from son due to movement difficulties    Prior Function Per Pt/Caregiver Report:  Level of [place]: Needs assistance with ADLs, Needs assistance with homemaking  Receives Help From: Other (Comment) (Son)  Ambulatory Assistance: Independent ([place] PALM, 4WW)    Imaging:  MRI lumbar spine:    IMPRESSION:  Stable appearing  examination from 04/23/2023.      Stable laminectomy and posterior fusion at L5-S1. No abnormal  enhancement nor enhancing lesions.      Stable degenerative changes at L4-5 and L5-S1.        OBJECTIVE:  Objective   Posture:  Posture Comment: Flexed posturing  Range of Motion:  Range of Motion Comments: Trunk flexion AROM impairment 50%, 75% extension, rotation WFL, SLR painful  Strength:  Strength Comments: 2+ to 3-/5 trunk and proximal LE's.  3 to 3+/5 MMT distal quad/hamstrings, ankle DF  Flexibility:  Flexibility Comment: Tight posterior chain  Palpation:  Palpation Comment: Diffuse tenderness lumbar paraspinals, gluteals, hamstrings  Special Tests:  Special Tests Comment: +Neural tension flexion test, + SLR B/L  Gait:  Gait Comment: Guarded, leans heavily to R via RUE cane, decreased B/L LE step/stride length.  Fair balance.  Denies falls  Balance:  Balance Comment: Fair; requires cane, delayed righting reactions to pain.  Stairs:  Stairs Comment: N/A  Bed Mobility:  Bed Mobility Comment: Independent, requires increased time  Transfers:  Transfers Comment: Independent, requires increased time  Other:  Comment: Diffuse, scattered paraesthesias B/L LE's.  Light touch intact.  Tone WFL.  Standing endurance < 2 minutes before exacerbation of LBP.  Gross motor control WFL but bradykinetic.    Outcome Measures:        Assessment  PT Assessment Results: Decreased strength, Decreased range of motion, Decreased endurance, Impaired balance, Decreased mobility, Decreased coordination, Impaired sensation  Rehab Prognosis: Good  Evaluation/Treatment Tolerance: Patient limited by pain  Assessment Comment: Impaired functional mobility/activity tolerance with moderate to severe general weakness of trunk/LE's due to h/o advanced lumbar arthritis, degeneration    Pt is a 48 y.o. female who presents with impairments of LBP, LE radicular pain, paraesthesia and weakness. These impairments have led to functional limitations including  impaired posture, mobility, standing endurance. Pt would benefit from skilled physical therapy intervention to improve above impairments and facilitate return to function.    Plan  Treatment/Interventions: Aquatic therapy, Dry needling, Education/ Instruction, Electrical stimulation, Gait training, Manual therapy, Neuromuscular re-education, Orthosis fit/ training, Prosthetic management/ training, Self care/ home management, Taping techniques, Therapeutic activities, Therapeutic exercises, Ultrasound  PT Plan: Skilled PT  PT Frequency: 2 times per week  Onset Date: 05/31/24  Certification Period End Date: 07/31/24  Number of Treatments Authorized: 12 visits (including evaluation)  Rehab Potential: Good  Plan of Care Agreement: Patient    Plan for next visit: Start aquatics, focus on deep end decompression with exercise/hanging.     OP EDUCATION:  Outpatient Education  Individual(s) Educated: Patient  Education Provided: POC  Diagnosis and Precautions: LBP secondary lumbar disc displacement  Risk and Benefits Discussed with Patient/Caregiver/Other: yes  Patient/Caregiver Demonstrated Understanding: yes  Plan of Care Discussed and Agreed Upon: yes  Patient Response to Education: Patient/Caregiver Verbalized Understanding of Information    Today's Treatment:  Evaluation and discussion only today  HEP to be completed daily, exercises include:  To be developed    Goals:  Active       Outpatient PT goals       Short Term Goals       Start:  05/31/24    Expected End:  07/15/24       STG 1: Patient will be IND with basic core/hip strengthening HEP x 5 visits.  STG 2: Patient will report 10% reduction in overall back pain symptoms x 5-6 visits.          Long Term Goals       Start:  05/31/24    Expected End:  08/29/24       LTG 1: Patient will report < 60% impairment Oswestry pain scale.  LTG 2: Patient will report LBP < 7/10 x 2 weeks for improved activity tolerance  LTG 3: Patient will report 10 minute standing endurance  with no exacerbation of LBP  LTG 4: Patient will be independent with seated shower/bathing/sponge bath as sign of improved endurance from completing physical therapy.

## 2024-06-05 NOTE — PROGRESS NOTES
Physical Therapy Treatment    Patient Name: Janae Roman  MRN: [id number]  Encounter date:  6/6/2024  Time Calculation  Start Time:   Stop Time:   Time Calculation (min): 40 min  PT Evaluation Time Entry  PT Evaluation (Moderate) Time Entry: 40    Visit Number:  2 (including evaluation)  Planned total visits: 11  Visits Authorized/Insurance Coverage:  Ins Oscar Rcvd [id number]        Progress Note At Visit #11    Current Problem  Problem List Items Addressed This Visit    None      Surgery:   No recent surgical intervention     Precautions:   Back pain hx, h/o lumbar surgery, NO TRACTION     Relevant PMH:  Lumbar surgery in past, Fibromyalgia       Pain      Subjective  General      Objective      Treatment:  Aquatic therapy  Pool Exercises: Monitor soreness after performing, if pain increases hold off on exercises and we will do them in therapy.      Warm up: 2 laps across pool forward walking    Focus on deep end decompression with exercise/hanging.     Standing at rail, each leg:   -Heel Raises x 15  -Toe raises x 15  -Hip abduction/side kicks x 15  -Hip Ext/kick backs x 15  -SLR/Hip flexion/ forward kicks x 15  -Hamstring curls x 15   -Seated leg kicks x 15    Patient then moved to the 7' depth.  Completed:  - Hang with pool noodle to unweight LE and trunk x 5 minutes  - Hip abduction/side kicks 2 minutes  - Hang 2 minutes  - Bicycles x 2 minutes.  - Hang x 2 minutes.  - Scissor Kicking/forward-backward x 2 minutes.  - Dangle x 5 minutes.     Forward Walk 2 laps across pool to cool down  Standing hamstring stretch at pool steps, 2 x 20 seconds each leg    Current HEP:    Activity tolerance:  {Activity:53085}    OP EDUCATION:       Assessment:     Pt's response to treatment:  ***  Areas of improvements:  ***  Limitations/deficits:  ***    Pain end of session: ***    Plan:    {BASPLAN:55397}    Assessment of current progress against goals:  {BASPTNOTEGOALASSESSMENT:44712}    Goals:    Active         Outpatient PT goals          Short Term Goals         Start:  05/31/24    Expected End:  07/15/24        STG 1: Patient will be IND with basic core/hip strengthening HEP x 5 visits.  STG 2: Patient will report 10% reduction in overall back pain symptoms x 5-6 visits.            Long Term Goals         Start:  05/31/24    Expected End:  08/29/24        LTG 1: Patient will report < 60% impairment Oswestry pain scale.  LTG 2: Patient will report LBP < 7/10 x 2 weeks for improved activity tolerance  LTG 3: Patient will report 10 minute standing endurance with no exacerbation of LBP  LTG 4: Patient will be independent with seated shower/bathing/sponge bath as sign of improved endurance from completing physical therapy.

## 2024-06-06 ENCOUNTER — APPOINTMENT (OUTPATIENT)
Dept: PHYSICAL THERAPY | Facility: CLINIC | Age: 49
End: 2024-06-06
Payer: COMMERCIAL

## 2024-06-12 ENCOUNTER — TREATMENT (OUTPATIENT)
Dept: PHYSICAL THERAPY | Facility: CLINIC | Age: 49
End: 2024-06-12
Payer: COMMERCIAL

## 2024-06-12 DIAGNOSIS — M51.27 OTHER INTERVERTEBRAL DISC DISPLACEMENT, LUMBOSACRAL REGION: ICD-10-CM

## 2024-06-12 PROCEDURE — 97113 AQUATIC THERAPY/EXERCISES: CPT | Mod: GP,CQ

## 2024-06-12 NOTE — PROGRESS NOTES
"    Physical Therapy Treatment    Patient Name: Janae Roman  MRN: [id number]  Encounter date:  6/12/2024  Time Calculation  Start Time: 0815  Stop Time: 0900  Time Calculation (min): 45 min     PT Therapeutic Procedures Time Entry  Aquatic Therapy Time Entry: 38    Visit Number:  2 (including evaluation)  Planned total visits: 12  Visit Authorized/insurance coverage:  [facility] [id number], M51.27 APPROVED,  12V AQUA 2X A WEEK 05/17/2024-07/31/2024   Progress Report due visit #12    Plan for next visit: Start aquatics, focus on deep end decompression with exercise/hanging.     Current Problem  Problem List Items Addressed This Visit             ICD-10-CM    Other intervertebral disc displacement, lumbosacral region M51.27      Precautions    Back pain hx, h/o lumbar surgery, NO TRACTION     Pain   10/10 \"I did not have a good couple of days\"   No Facial grimace on deck but some in shallow water and guarding observed    Subjective  General   No new symptoms        Objective  Step to descent leading with LLE into pool and bilateral hand rails.     Treatment:  Aquatic    SPC on deck     bilateral handrail assist and step-to leading with Left  foot    4 ft depth  Water walking for lumbopelvic ROM and stability: FORWARD, SIDE STEP, and BACKWARDS, 3 LAPS    Deep end for core, decompression, lumbopelvic stability:  - Hang x 2'  - Bicycle x 1'  - Hang x 2'  - Hip ABD/ADD x 1'  - Hang x 2'  - Cross country x 1'  Repeated above twice   -Hang 2'    Trail of hamstring     OP EDUCATION:       Assessment:   The patient did well with initiation of aquatics. Focused on deep end decompression, exercises and hanging.   Areas of improvements:  Decreased pain  Limitations/deficits:  Weakness, Pain limits walking and ADL's, and Difficult to walk due to LB pain and neuropathy    Pain end of session:  7-8/10 \"The pain is back to my normal level.\"     Plan:     Continue with current POC/no changes    Assessment of current progress against " goals:  Insufficient treatment time to assess progress    Goals:  Active       Outpatient PT goals       Short Term Goals       Start:  05/31/24    Expected End:  07/15/24       STG 1: Patient will be IND with basic core/hip strengthening HEP x 5 visits.  STG 2: Patient will report 10% reduction in overall back pain symptoms x 5-6 visits.          Long Term Goals       Start:  05/31/24    Expected End:  08/29/24       LTG 1: Patient will report < 60% impairment Oswestry pain scale.  LTG 2: Patient will report LBP < 7/10 x 2 weeks for improved activity tolerance  LTG 3: Patient will report 10 minute standing endurance with no exacerbation of LBP  LTG 4: Patient will be independent with seated shower/bathing/sponge bath as sign of improved endurance from completing physical therapy.

## 2024-06-21 ENCOUNTER — APPOINTMENT (OUTPATIENT)
Dept: PHYSICAL THERAPY | Facility: CLINIC | Age: 49
End: 2024-06-21
Payer: COMMERCIAL

## 2024-06-24 DIAGNOSIS — F39 MOOD DISORDER (CMS-HCC): ICD-10-CM

## 2024-06-24 RX ORDER — BUSPIRONE HYDROCHLORIDE 10 MG/1
20 TABLET ORAL 2 TIMES DAILY
Qty: 360 TABLET | Refills: 1 | Status: SHIPPED | OUTPATIENT
Start: 2024-06-24

## 2024-06-27 ENCOUNTER — TREATMENT (OUTPATIENT)
Dept: PHYSICAL THERAPY | Facility: CLINIC | Age: 49
End: 2024-06-27
Payer: COMMERCIAL

## 2024-06-27 DIAGNOSIS — M51.27 OTHER INTERVERTEBRAL DISC DISPLACEMENT, LUMBOSACRAL REGION: ICD-10-CM

## 2024-06-27 PROCEDURE — 97113 AQUATIC THERAPY/EXERCISES: CPT | Mod: GP

## 2024-06-27 ASSESSMENT — PAIN - FUNCTIONAL ASSESSMENT: PAIN_FUNCTIONAL_ASSESSMENT: 0-10

## 2024-06-27 ASSESSMENT — PAIN SCALES - GENERAL: PAINLEVEL_OUTOF10: 7

## 2024-06-27 NOTE — PROGRESS NOTES
Physical Therapy Treatment    Patient Name: Janae Roman  MRN: [id number]  Encounter date:  6/27/2024  Time Calculation  Start Time: 0730  Stop Time: 0814  Time Calculation (min): 44 min     PT Therapeutic Procedures Time Entry  Aquatic Therapy Time Entry: 44    Visit Number:  3 (including evaluation)  Planned total visits: 12  Visits Authorized/Insurance Coverage:  [facility] [id number], M51.27 APPROVED,  12V AQUA 2X A WEEK 05/17/2024-07/31/2024     Current Problem  Problem List Items Addressed This Visit             ICD-10-CM    Other intervertebral disc displacement, lumbosacral region M51.27       Precautions  Precautions  Precautions Comment: Back pain hx, h/o lumbar surgery, NO TRACTION    General:  General  Reason for Referral: Other invertebral disc displacement, LBp, LE pain, impaired core and LE strengthening.  Referred By: Dr. Kyler Giron, , [facility] patient    Pain  Pain Assessment: 0-10  0-10 (Numeric) Pain Score: 7  Pain Location: Back  Pain Radiating Towards: Radiates down R>LLE    Subjective  Pain is fairly high today, present down into both feet. Pool was helpful after last session.    Objective  Performs stairs nonreciprocally with heavy reliance on railings    Treatment:  Aquatic Exercise  Aquatic Exercise Performed: Yes  Walk x3 laps fwd, bwd, s/s across pool    Deep, 5' with noodle under arms, BUE support:  Decompression x2'  Hip abd/adduction x2'  XC ski x2'  Bike x2'  SKTC x2'  Decompression x2'    Repeated above x2    Assessment:  Pt's response to treatment:  Pt with numbness in RLE with WB in pool so majority of session spent in deep water for decompression. Pt still had some numbness however less so in non WB. Pain down somewhat at exit.   Areas of improvements:  pain relief from treatment  Limitations/deficits:  Numbness in WB    Pain end of session: 5    Plan:     Continue with current POC/no changes    Assessment of current progress against goals:  Progressing toward functional  goals    Goals:  Active       Outpatient PT goals       Short Term Goals       Start:  05/31/24    Expected End:  07/15/24       STG 1: Patient will be IND with basic core/hip strengthening HEP x 5 visits.  STG 2: Patient will report 10% reduction in overall back pain symptoms x 5-6 visits.          Long Term Goals       Start:  05/31/24    Expected End:  08/29/24       LTG 1: Patient will report < 60% impairment Oswestry pain scale.  LTG 2: Patient will report LBP < 7/10 x 2 weeks for improved activity tolerance  LTG 3: Patient will report 10 minute standing endurance with no exacerbation of LBP  LTG 4: Patient will be independent with seated shower/bathing/sponge bath as sign of improved endurance from completing physical therapy.

## 2024-06-27 NOTE — LETTER
June 27, 2024    Antonio Giron DO  [address]  [place] [address]    Patient: Janae Roman   YOB: 1975   Date of Visit: 6/27/2024       Dear Antonio Giron DO  [address] [place] [address]    The attached plan of care is being sent to you because your patient’s medical reimbursement requires that you certify the plan of care. Your signature is required to allow uninterrupted insurance coverage.      You may indicate your approval by signing below and faxing this form back to us at Dept Fax: [phone].    Please call Dept: [phone] with any questions or concerns.    Thank you for this referral,        Nikita Hartman, PT  [facility]  [address] [place] [address]    Payer: Payor: [employer] / Plan: [employer] / Product Type: *No Product type* /                                                                         Date:     Dear Nikita Hartman, PT,     Re: Ms. Janae Roman, MRN:[id number]    I certify that I have reviewed the attached plan of care and it is medically necessary for Ms. Janae Roman (1975) who is under my care.          ______________________________________                    _________________  Provider name and credentials                                           Date and time                                                                                           Plan of Care 5/31/24   Effective from: 5/31/2024  Effective to: 8/29/2024    Plan ID: [id number]                Participants as of Finalize on 6/2/2024      Name Type Comments Contact Info    Antonio Giron DO Referring Provider Thank you for the referral. [phone]    Nikita Khan, PT Physical Therapist  [phone]           Last Plan Note       Author: Nikita Khan PT Status: Sign when Signing Visit Last edited: 5/31/2024  8:45 AM             Physical Therapy Evaluation    Patient Name: Janae  Abel  MRN: [id number]  Evaluation Date: 5/31/2024  Time Calculation  Start Time: 0845  Stop Time: 0925  Time Calculation (min): 40 min  PT Evaluation Time Entry  PT Evaluation (Moderate) Time Entry: 40          Problem List Items Addressed This Visit             ICD-10-CM    Other intervertebral disc displacement, lumbosacral region M51.27    Relevant Orders    Follow Up In Physical Therapy         Subjective   General:  General  Reason for Referral: Other invertebral disc displacement, LBp, LE pain, impaired core and LE strengthening.  Referred By: Dr. Kyler Giron, DO, [facility] patient  Past Medical History Relevant to Rehab: 49 y/o F previously known to this clinic for treatment for LBP.  Presents with continued LBP and feels overall activity level is reduced.  Does falls or events.  Denies trauma.  Reports burning paraesthesia's in LE's, limited trunk motion and standing endurance.  [facility] patient with RX for aquatics and goal to improve strength and reduce pain.  General Comment: Patient prefers to do aquatics    Patient reported hx of condition: H/O lumbar surgery, disc degeneration, displacement      Surgery:   No recent surgical intervention    Precautions:   Back pain hx, h/o lumbar surgery, NO TRACTION    Relevant PMH:  Lumbar surgery in past, Fibromyalgia    Red flags: No    Pain:  Pain Assessment: 0-10  Pain Score: 8  Pain Type: Chronic pain  Pain Location: Back  Pain Orientation: Right, Left  Pain Radiating Towards: Radicular pain/paraesthesias in to LE's  Patient's Stated Pain Goal: 5  Home Living:  Home Living Comment: Has assist from son due to movement difficulties    Prior Function Per Pt/Caregiver Report:  Level of [place]: Needs assistance with ADLs, Needs assistance with homemaking  Receives Help From: Other (Comment) (Son)  Ambulatory Assistance: Independent (James PALM, 4WW)    Imaging:  MRI lumbar spine:    IMPRESSION:  Stable appearing examination from 04/23/2023.      Stable laminectomy and  posterior fusion at L5-S1. No abnormal  enhancement nor enhancing lesions.      Stable degenerative changes at L4-5 and L5-S1.        OBJECTIVE:  Objective   Posture:  Posture Comment: Flexed posturing  Range of Motion:  Range of Motion Comments: Trunk flexion AROM impairment 50%, 75% extension, rotation WFL, SLR painful  Strength:  Strength Comments: 2+ to 3-/5 trunk and proximal LE's.  3 to 3+/5 MMT distal quad/hamstrings, ankle DF  Flexibility:  Flexibility Comment: Tight posterior chain  Palpation:  Palpation Comment: Diffuse tenderness lumbar paraspinals, gluteals, hamstrings  Special Tests:  Special Tests Comment: +Neural tension flexion test, + SLR B/L  Gait:  Gait Comment: Guarded, leans heavily to R via RUE cane, decreased B/L LE step/stride length.  Fair balance.  Denies falls  Balance:  Balance Comment: Fair; requires cane, delayed righting reactions to pain.  Stairs:  Stairs Comment: N/A  Bed Mobility:  Bed Mobility Comment: Independent, requires increased time  Transfers:  Transfers Comment: Independent, requires increased time  Other:  Comment: Diffuse, scattered paraesthesias B/L LE's.  Light touch intact.  Tone WFL.  Standing endurance < 2 minutes before exacerbation of LBP.  Gross motor control WFL but bradykinetic.    Outcome Measures:        Assessment  PT Assessment Results: Decreased strength, Decreased range of motion, Decreased endurance, Impaired balance, Decreased mobility, Decreased coordination, Impaired sensation  Rehab Prognosis: Good  Evaluation/Treatment Tolerance: Patient limited by pain  Assessment Comment: Impaired functional mobility/activity tolerance with moderate to severe general weakness of trunk/LE's due to h/o advanced lumbar arthritis, degeneration    Pt is a 48 y.o. female who presents with impairments of LBP, LE radicular pain, paraesthesia and weakness. These impairments have led to functional limitations including impaired posture, mobility, standing endurance. Pt would  benefit from skilled physical therapy intervention to improve above impairments and facilitate return to function.    Plan  Treatment/Interventions: Aquatic therapy, Dry needling, Education/ Instruction, Electrical stimulation, Gait training, Manual therapy, Neuromuscular re-education, Orthosis fit/ training, Prosthetic management/ training, Self care/ home management, Taping techniques, Therapeutic activities, Therapeutic exercises, Ultrasound  PT Plan: Skilled PT  PT Frequency: 2 times per week  Onset Date: 05/31/24  Certification Period End Date: 07/31/24  Number of Treatments Authorized: 12 visits (including evaluation)  Rehab Potential: Good  Plan of Care Agreement: Patient    Plan for next visit: Start aquatics, focus on deep end decompression with exercise/hanging.     OP EDUCATION:  Outpatient Education  Individual(s) Educated: Patient  Education Provided: POC  Diagnosis and Precautions: LBP secondary lumbar disc displacement  Risk and Benefits Discussed with Patient/Caregiver/Other: yes  Patient/Caregiver Demonstrated Understanding: yes  Plan of Care Discussed and Agreed Upon: yes  Patient Response to Education: Patient/Caregiver Verbalized Understanding of Information    Today's Treatment:  Evaluation and discussion only today  HEP to be completed daily, exercises include:  To be developed    Goals:  Active       Outpatient PT goals       Short Term Goals       Start:  05/31/24    Expected End:  07/15/24       STG 1: Patient will be IND with basic core/hip strengthening HEP x 5 visits.  STG 2: Patient will report 10% reduction in overall back pain symptoms x 5-6 visits.          Long Term Goals       Start:  05/31/24    Expected End:  08/29/24       LTG 1: Patient will report < 60% impairment Oswestry pain scale.  LTG 2: Patient will report LBP < 7/10 x 2 weeks for improved activity tolerance  LTG 3: Patient will report 10 minute standing endurance with no exacerbation of LBP  LTG 4: Patient will be  independent with seated shower/bathing/sponge bath as sign of improved endurance from completing physical therapy.                                 Current Participants as of 6/27/2024      Name Type Comments Contact Info    Antonio Giron, DO Referring Provider Thank you for the referral. [phone]    Signature pending    Nikita Khan, PT Physical Therapist  [phone]    Electronically signed by Nikita Khan PT at 6/2/2024 1521 EDT

## 2024-06-30 DIAGNOSIS — F41.9 ANXIETY: ICD-10-CM

## 2024-07-02 RX ORDER — GABAPENTIN 800 MG/1
TABLET ORAL
Qty: 120 TABLET | Refills: 5 | Status: SHIPPED | OUTPATIENT
Start: 2024-07-02 | End: 2024-07-03 | Stop reason: SDUPTHER

## 2024-07-03 ENCOUNTER — TREATMENT (OUTPATIENT)
Dept: PHYSICAL THERAPY | Facility: CLINIC | Age: 49
End: 2024-07-03
Payer: COMMERCIAL

## 2024-07-03 DIAGNOSIS — F41.9 ANXIETY: ICD-10-CM

## 2024-07-03 DIAGNOSIS — M51.27 OTHER INTERVERTEBRAL DISC DISPLACEMENT, LUMBOSACRAL REGION: ICD-10-CM

## 2024-07-03 PROCEDURE — 97113 AQUATIC THERAPY/EXERCISES: CPT | Mod: GP

## 2024-07-03 NOTE — TELEPHONE ENCOUNTER
[employer] coverage     See rx   Last visit 02/22/24    Patient said pharmacy only filled half her prescription yesterday and told patient to call and request the other half be sent. // Please call the pharmacy with any questions [phone]

## 2024-07-03 NOTE — PROGRESS NOTES
"       Physical Therapy Treatment     Patient Name: Janae Roman  MRN: [id number]  Encounter date:  7/3/2024  Time Calculation  Start Time: 0745  Stop Time: 0825  Time Calculation (min): 40 min  PT Therapeutic Procedures Time Entry  Aquatic Therapy Time Entry: 40     Visit Number:  4 (including evaluation)  Planned total visits: 12  Visits Authorized/Insurance Coverage:  [facility] [id number], M51.27 APPROVED,  12V AQUA 2X A WEEK 05/17/2024-07/31/2024      Current Problem  Problem List Items Addressed This Visit               ICD-10-CM     Other intervertebral disc displacement, lumbosacral region M51.27         Precautions  Precautions  Precautions Comment: Back pain hx, h/o lumbar surgery, NO TRACTION     Pain  9/10 L lumbar     Subjective  Back \"pretty bad this morning\".  Denies contributing cause.  Denies falls.     Objective  Performs stairs nonreciprocally with heavy reliance on railings     Treatment:  Aquatic Exercise  Aquatic Exercise Performed: Yes  Walk x3 laps fwd, bwd, s/s across pool     Deep, 5' with noodle under arms, BUE support:  Decompression x2'  Hip abd/adduction x2'  XC ski x2'  Bike x2'  SKTC x2'  Decompression x2'     Repeated above x2     Assessment: Minor reduction in pain symptoms with today's tx. Slow overall symptomatic progress.  Improved gait pattern today.    Pain end of session: 7     Plan:  Continue with current POC/no changes     Assessment of current progress against goals:  Progressing toward functional goals     Goals:  Active         Outpatient PT goals         Short Term Goals         Start:  05/31/24    Expected End:  07/15/24        STG 1: Patient will be IND with basic core/hip strengthening HEP x 5 visits.  STG 2: Patient will report 10% reduction in overall back pain symptoms x 5-6 visits.            Long Term Goals         Start:  05/31/24    Expected End:  08/29/24        LTG 1: Patient will report < 60% impairment Oswestry pain scale.  LTG 2: Patient will report LBP < 7/10 x 2 " weeks for improved activity tolerance  LTG 3: Patient will report 10 minute standing endurance with no exacerbation of LBP  LTG 4: Patient will be independent with seated shower/bathing/sponge bath as sign of improved endurance from completing physical therapy.

## 2024-07-04 RX ORDER — GABAPENTIN 800 MG/1
800 TABLET ORAL 3 TIMES DAILY
Qty: 270 TABLET | Refills: 1 | Status: SHIPPED | OUTPATIENT
Start: 2024-07-04 | End: 2025-07-04

## 2024-07-10 ENCOUNTER — TREATMENT (OUTPATIENT)
Dept: PHYSICAL THERAPY | Facility: CLINIC | Age: 49
End: 2024-07-10
Payer: COMMERCIAL

## 2024-07-10 DIAGNOSIS — M51.27 OTHER INTERVERTEBRAL DISC DISPLACEMENT, LUMBOSACRAL REGION: ICD-10-CM

## 2024-07-10 PROCEDURE — 97113 AQUATIC THERAPY/EXERCISES: CPT | Mod: GP

## 2024-07-10 NOTE — PROGRESS NOTES
"       Physical Therapy Treatment     Patient Name: Janae Roman  MRN: [id number]  Encounter date:  7/10/2024  Time Calculation  Start Time: 0740  Stop Time: 0820  Time Calculation (min): 40 min  PT Therapeutic Procedures Time Entry  Aquatic Therapy Time Entry: 40     Visit Number:  5 (including evaluation)  Planned total visits: 12  Visits Authorized/Insurance Coverage:  [facility] [id number], M51.27 APPROVED,  12V AQUA 2X A WEEK 05/17/2024-07/31/2024      Current Problem  Problem List Items Addressed This Visit               ICD-10-CM     Other intervertebral disc displacement, lumbosacral region M51.27         Precautions  Precautions  Precautions Comment: Back pain hx, h/o lumbar surgery, NO TRACTION     Pain  9/10    Subjective  \"It's about the same as it was last week but I felt a little better after last week's session\"    Objective  Slow, guarded gait with flexed posturing    Treatment:  Aquatic Exercise  Aquatic Exercise Performed: Yes  Walk x3 laps fwd, bwd, s/s across pool     Deep, 5' with noodle under arms, BUE support:  Decompression x2'  Hip abd/adduction x2'  XC ski x2'  Bike x2'  SKTC x2'  Decompression x2'  Foot to wall stretch x 2'    Repeated above x2     Assessment: Inconsistent pain control thus far.  Some minor reduction with movement in pool but patient returns to follow up visits with continued high levels of pain.      Pain end of session: 7.5 lumbar and RLE     Plan:  Continue with current POC/no changes     Assessment of current progress against goals:  Progressing toward functional goals     Goals:  Active         Outpatient PT goals         Short Term Goals         Start:  05/31/24    Expected End:  07/15/24        STG 1: Patient will be IND with basic core/hip strengthening HEP x 5 visits.  STG 2: Patient will report 10% reduction in overall back pain symptoms x 5-6 visits.            Long Term Goals         Start:  05/31/24    Expected End:  08/29/24        LTG 1: Patient will report < 60% " impairment Oswestry pain scale.  LTG 2: Patient will report LBP < 7/10 x 2 weeks for improved activity tolerance  LTG 3: Patient will report 10 minute standing endurance with no exacerbation of LBP  LTG 4: Patient will be independent with seated shower/bathing/sponge bath as sign of improved endurance from completing physical therapy.

## 2024-07-12 ENCOUNTER — TREATMENT (OUTPATIENT)
Dept: PHYSICAL THERAPY | Facility: CLINIC | Age: 49
End: 2024-07-12
Payer: COMMERCIAL

## 2024-07-12 DIAGNOSIS — M51.27 OTHER INTERVERTEBRAL DISC DISPLACEMENT, LUMBOSACRAL REGION: ICD-10-CM

## 2024-07-12 PROCEDURE — 97113 AQUATIC THERAPY/EXERCISES: CPT | Mod: GP

## 2024-07-12 NOTE — PROGRESS NOTES
"       Physical Therapy Treatment     Patient Name: Janae Roman  MRN: [id number]  Encounter date:  7/12/2024  Time Calculation  Start Time: 0740  Stop Time: 0820  Time Calculation (min): 40 min  PT Therapeutic Procedures Time Entry  Aquatic Therapy Time Entry: 40     Visit Number:  6 (including evaluation)  Planned total visits: 12  Visits Authorized/Insurance Coverage:  [facility] [id number], M51.27 APPROVED,  12V AQUA 2X A WEEK 05/17/2024-07/31/2024      Current Problem  Problem List Items Addressed This Visit               ICD-10-CM     Other intervertebral disc displacement, lumbosacral region M51.27         Precautions  Precautions  Precautions Comment: Back pain hx, h/o lumbar surgery, NO TRACTION     Pain  7.5/10 lumbar     Subjective  \"Feeling a little better than the other day\"     Objective  Slow, guarded gait with flexed posturing     Treatment:  Aquatic Exercise  Aquatic Exercise Performed: Yes  Walk x3 laps fwd, bwd, s/s across pool     Deep, 5' with noodle under arms, BUE support:  Decompression x2'  Hip abd/adduction x2'  XC ski x2'  Bike x2'  SKTC x2'  Decompression x2'  Foot to wall stretch x 2'     Repeated above x2     Assessment: Reduced pain overall this week.  Patient contributes to increased time floating in deep end.  Will plan to progress to more core and LE strengthening next week.      Pain end of session: 6-7 \"I feel better\"     Plan:  Added LE strengthening at HR next visit.     Assessment of current progress against goals:  Progressing toward functional goals     Goals:  Active         Outpatient PT goals         Short Term Goals         Start:  05/31/24    Expected End:  07/15/24        STG 1: Patient will be IND with basic core/hip strengthening HEP x 5 visits.  STG 2: Patient will report 10% reduction in overall back pain symptoms x 5-6 visits.            Long Term Goals         Start:  05/31/24    Expected End:  08/29/24        LTG 1: Patient will report < 60% impairment Oswestry pain " scale.  LTG 2: Patient will report LBP < 7/10 x 2 weeks for improved activity tolerance  LTG 3: Patient will report 10 minute standing endurance with no exacerbation of LBP  LTG 4: Patient will be independent with seated shower/bathing/sponge bath as sign of improved endurance from completing physical therapy.

## 2024-07-17 ENCOUNTER — TREATMENT (OUTPATIENT)
Dept: PHYSICAL THERAPY | Facility: CLINIC | Age: 49
End: 2024-07-17
Payer: COMMERCIAL

## 2024-07-17 DIAGNOSIS — M51.27 OTHER INTERVERTEBRAL DISC DISPLACEMENT, LUMBOSACRAL REGION: ICD-10-CM

## 2024-07-17 PROCEDURE — 97113 AQUATIC THERAPY/EXERCISES: CPT | Mod: GP

## 2024-07-17 NOTE — PROGRESS NOTES
"       Physical Therapy Treatment     Patient Name: Janae Roman  MRN: [id number]  Encounter date:  7/17/2024  Time Calculation  Start Time: 0745  Stop Time: 0825  Time Calculation (min): 40 min  PT Therapeutic Procedures Time Entry  Aquatic Therapy Time Entry: 40     Visit Number:  7 (including evaluation)  Planned total visits: 12  Visits Authorized/Insurance Coverage:  [facility] [id number], M51.27 APPROVED,  12V AQUA 2X A WEEK 05/17/2024-07/31/2024      Current Problem  Problem List Items Addressed This Visit               ICD-10-CM     Other intervertebral disc displacement, lumbosacral region M51.27         Precautions  Precautions  Precautions Comment: Back pain hx, h/o lumbar surgery, NO TRACTION     Pain  8 lumbar    Subjective  \"I hurt\"     Objective  Remains flexed, guarded, decreased B/L LE step length.  Cane RUE (single point)     Treatment:  Aquatic Exercise  Aquatic Exercise Performed: Yes  Walk x3 laps fwd, bwd, s/s across pool     Deep, 5' with noodle under arms, BUE support:  Decompression x2'  Hip abd/adduction x2'  XC ski x2'  Bike x2'  SKTC x2'  Decompression x2'  Foot to wall stretch x 2'     Repeated above x2     Assessment: Continued emphasis in deep end time > shallow with exercise.  Symptoms temporarily improve after PT but patient returns to PT in high levels of pain.  Questionable overall progress thus far.  Will re-evaluate at visit 12 or by 7/31.     Pain end of session: 6     Plan:  Added LE strengthening at HR next visit.     Assessment of current progress against goals:  Progressing toward functional goals     Goals:  Active         Outpatient PT goals         Short Term Goals         Start:  05/31/24    Expected End:  07/15/24        STG 1: Patient will be IND with basic core/hip strengthening HEP x 5 visits.  STG 2: Patient will report 10% reduction in overall back pain symptoms x 5-6 visits.            Long Term Goals         Start:  05/31/24    Expected End:  08/29/24        LTG 1: " Patient will report < 60% impairment Oswestry pain scale.  LTG 2: Patient will report LBP < 7/10 x 2 weeks for improved activity tolerance  LTG 3: Patient will report 10 minute standing endurance with no exacerbation of LBP  LTG 4: Patient will be independent with seated shower/bathing/sponge bath as sign of improved endurance from completing physical therapy.

## 2024-07-19 ENCOUNTER — APPOINTMENT (OUTPATIENT)
Dept: PHYSICAL THERAPY | Facility: CLINIC | Age: 49
End: 2024-07-19
Payer: COMMERCIAL

## 2024-07-24 ENCOUNTER — TREATMENT (OUTPATIENT)
Dept: PHYSICAL THERAPY | Facility: CLINIC | Age: 49
End: 2024-07-24
Payer: COMMERCIAL

## 2024-07-24 DIAGNOSIS — M51.27 OTHER INTERVERTEBRAL DISC DISPLACEMENT, LUMBOSACRAL REGION: ICD-10-CM

## 2024-07-24 PROCEDURE — 97113 AQUATIC THERAPY/EXERCISES: CPT | Mod: GP,CQ

## 2024-07-24 ASSESSMENT — PAIN SCALES - GENERAL: PAINLEVEL_OUTOF10: 7

## 2024-07-24 ASSESSMENT — PAIN - FUNCTIONAL ASSESSMENT: PAIN_FUNCTIONAL_ASSESSMENT: 0-10

## 2024-07-24 NOTE — PROGRESS NOTES
"    Physical Therapy Treatment    Patient Name: Janae Roman  MRN: [id number]  Encounter date:  7/24/2024  Time Calculation  Start Time: 0730  Stop Time: 0815  Time Calculation (min): 45 min     PT Therapeutic Procedures Time Entry  Aquatic Therapy Time Entry: 40    Visit Number:  8 (including evaluation)  Planned total visits: 12  Visits Authorized/Insurance Coverage:  [facility] [id number], M51.27 APPROVED,  12V AQUA 2X A WEEK 05/17/2024-07/31/2024     Current Problem  Problem List Items Addressed This Visit             ICD-10-CM    Other intervertebral disc displacement, lumbosacral region M51.27     Precautions  Precautions  Precautions Comment: Back pain hx, h/o lumbar surgery, NO TRACTION    General:     Pain  Pain Assessment: 0-10  0-10 (Numeric) Pain Score: 7  Pain Location: Back  Pain Radiating Towards: Radiates down BLE R>L and tight L.    Subjective  \"I had to sit at the [facility] for a few hours and it did not help my symptoms.\"     Objective  Cane on deck with guarded gait.   Enters and exits pool with heavy UE and step to pattern.    Treatment:     Walk x3 laps fwd, bwd, s/s across pool    Deep, 7' with noodle under arms, BUE support:  Feet on wall stretch 15\" x 3  Decompression x2'  Hip abd/adduction x2'  XC ski x2'  Bike x2'  SKTC x1' aggravated symtoms  -second round DKTC 1'    Repeated above x2    Assessment:  Pt's response to treatment:  Continued to focus on deep end to address radicular symptoms.   Areas of improvements:  pain relief from treatment  Limitations/deficits:  Numbness in WB    Pain end of session: 5.5-6/10    Plan:     Continue with current POC/no changes    Assessment of current progress against goals:  Progressing toward functional goals    Goals:  Active       Outpatient PT goals       Short Term Goals       Start:  05/31/24    Expected End:  07/15/24       STG 1: Patient will be IND with basic core/hip strengthening HEP x 5 visits.  STG 2: Patient will report 10% reduction in overall back " pain symptoms x 5-6 visits.          Long Term Goals       Start:  05/31/24    Expected End:  08/29/24       LTG 1: Patient will report < 60% impairment Oswestry pain scale.  LTG 2: Patient will report LBP < 7/10 x 2 weeks for improved activity tolerance  LTG 3: Patient will report 10 minute standing endurance with no exacerbation of LBP  LTG 4: Patient will be independent with seated shower/bathing/sponge bath as sign of improved endurance from completing physical therapy.

## 2024-07-26 ENCOUNTER — TREATMENT (OUTPATIENT)
Dept: PHYSICAL THERAPY | Facility: CLINIC | Age: 49
End: 2024-07-26
Payer: COMMERCIAL

## 2024-07-26 DIAGNOSIS — M51.27 OTHER INTERVERTEBRAL DISC DISPLACEMENT, LUMBOSACRAL REGION: ICD-10-CM

## 2024-07-26 PROCEDURE — 97113 AQUATIC THERAPY/EXERCISES: CPT | Mod: GP,CQ

## 2024-07-26 ASSESSMENT — PAIN - FUNCTIONAL ASSESSMENT: PAIN_FUNCTIONAL_ASSESSMENT: 0-10

## 2024-07-26 ASSESSMENT — PAIN SCALES - GENERAL: PAINLEVEL_OUTOF10: 6

## 2024-07-26 NOTE — PROGRESS NOTES
"    Physical Therapy Treatment    Patient Name: Janae Roman  MRN: [id number]  Encounter date:  7/26/2024  Time Calculation  Start Time: 0730  Stop Time: 0815  Time Calculation (min): 45 min     PT Therapeutic Procedures Time Entry  Aquatic Therapy Time Entry: 40    Visit Number:  9 (including evaluation)  Planned total visits: 12  Visits Authorized/Insurance Coverage:  [facility] [id number], M51.27 APPROVED,  12V AQUA 2X A WEEK 05/17/2024-07/31/2024     Current Problem  Problem List Items Addressed This Visit             ICD-10-CM    Other intervertebral disc displacement, lumbosacral region M51.27     Precautions  Precautions  Precautions Comment: Back pain hx, h/o lumbar surgery, NO TRACTION    General:     Pain  Pain Assessment: 0-10  0-10 (Numeric) Pain Score: 6  Pain Location: Back6/10    Subjective  \"I made [employer] blue last night so I was on my feet for a long time.\"     Objective  Step to pattern with ascent of steps and heavy UE support.     Treatment:     Walk x3 laps fwd, bwd, s/s across pool with red bar bell in each hand, submerged.     Deep, 7' with noodle under arms, BUE support:  Feet on wall stretch 15\" x 3  Decompression x2'  Hip abd/adduction x2'  XC ski x2'  Bike x2'  -second round Feet on wall stretch 15\" x 3  DKTC 1'    Repeated above x2    Water walking 2 laps ea fwd, retro, lateral     Assessment:  The patient reports pain remains in both her feet.   Pt's response to treatment:  Good   Areas of improvements:  pain relief from treatment  Limitations/deficits:  Numbness in WB    Pain end of session: 5    Plan:     Continue with current POC/no changes    Assessment of current progress against goals:  Progressing toward functional goals    Goals:  Active       Outpatient PT goals       Short Term Goals       Start:  05/31/24    Expected End:  07/15/24       STG 1: Patient will be IND with basic core/hip strengthening HEP x 5 visits.  STG 2: Patient will report 10% reduction in overall back pain symptoms " x 5-6 visits.          Long Term Goals       Start:  05/31/24    Expected End:  08/29/24       LTG 1: Patient will report < 60% impairment Oswestry pain scale.  LTG 2: Patient will report LBP < 7/10 x 2 weeks for improved activity tolerance  LTG 3: Patient will report 10 minute standing endurance with no exacerbation of LBP  LTG 4: Patient will be independent with seated shower/bathing/sponge bath as sign of improved endurance from completing physical therapy.

## 2024-07-31 ENCOUNTER — APPOINTMENT (OUTPATIENT)
Dept: PHYSICAL THERAPY | Facility: CLINIC | Age: 49
End: 2024-07-31
Payer: COMMERCIAL

## 2024-08-01 DIAGNOSIS — R00.0 TACHYCARDIA, UNSPECIFIED: ICD-10-CM

## 2024-08-01 DIAGNOSIS — E78.5 HYPERLIPIDEMIA, UNSPECIFIED: ICD-10-CM

## 2024-08-01 RX ORDER — METOPROLOL SUCCINATE 100 MG/1
TABLET, EXTENDED RELEASE ORAL
Qty: 135 TABLET | Refills: 1 | Status: SHIPPED | OUTPATIENT
Start: 2024-08-01

## 2024-08-01 RX ORDER — ATORVASTATIN CALCIUM 80 MG/1
80 TABLET, FILM COATED ORAL DAILY
Qty: 90 TABLET | Refills: 1 | Status: SHIPPED | OUTPATIENT
Start: 2024-08-01

## 2024-08-04 DIAGNOSIS — E03.9 ACQUIRED HYPOTHYROIDISM: ICD-10-CM

## 2024-08-05 RX ORDER — LEVOTHYROXINE SODIUM 125 UG/1
TABLET ORAL
Qty: 90 TABLET | Refills: 1 | Status: SHIPPED | OUTPATIENT
Start: 2024-08-05

## 2024-08-06 ENCOUNTER — HOSPITAL ENCOUNTER (EMERGENCY)
Facility: HOSPITAL | Age: 49
Discharge: HOME | End: 2024-08-06
Attending: STUDENT IN AN ORGANIZED HEALTH CARE EDUCATION/TRAINING PROGRAM
Payer: COMMERCIAL

## 2024-08-06 VITALS
HEART RATE: 99 BPM | HEIGHT: 72 IN | OXYGEN SATURATION: 98 % | BODY MASS INDEX: 35.76 KG/M2 | RESPIRATION RATE: 18 BRPM | WEIGHT: 264 LBS | DIASTOLIC BLOOD PRESSURE: 99 MMHG | TEMPERATURE: 99.1 F | SYSTOLIC BLOOD PRESSURE: 166 MMHG

## 2024-08-06 DIAGNOSIS — M54.42 CHRONIC LOW BACK PAIN WITH BILATERAL SCIATICA, UNSPECIFIED BACK PAIN LATERALITY: Primary | ICD-10-CM

## 2024-08-06 DIAGNOSIS — M54.41 CHRONIC LOW BACK PAIN WITH BILATERAL SCIATICA, UNSPECIFIED BACK PAIN LATERALITY: Primary | ICD-10-CM

## 2024-08-06 DIAGNOSIS — G89.29 CHRONIC LOW BACK PAIN WITH BILATERAL SCIATICA, UNSPECIFIED BACK PAIN LATERALITY: Primary | ICD-10-CM

## 2024-08-06 DIAGNOSIS — F41.9 ANXIETY: ICD-10-CM

## 2024-08-06 PROCEDURE — 96372 THER/PROPH/DIAG INJ SC/IM: CPT | Performed by: STUDENT IN AN ORGANIZED HEALTH CARE EDUCATION/TRAINING PROGRAM

## 2024-08-06 PROCEDURE — 99283 EMERGENCY DEPT VISIT LOW MDM: CPT

## 2024-08-06 PROCEDURE — 2500000004 HC RX 250 GENERAL PHARMACY W/ HCPCS (ALT 636 FOR OP/ED): Performed by: STUDENT IN AN ORGANIZED HEALTH CARE EDUCATION/TRAINING PROGRAM

## 2024-08-06 RX ORDER — ORPHENADRINE CITRATE 30 MG/ML
60 INJECTION INTRAMUSCULAR; INTRAVENOUS ONCE
Status: COMPLETED | OUTPATIENT
Start: 2024-08-06 | End: 2024-08-06

## 2024-08-06 RX ORDER — GABAPENTIN 800 MG/1
800 TABLET ORAL 3 TIMES DAILY
Qty: 270 TABLET | Refills: 1 | Status: SHIPPED | OUTPATIENT
Start: 2024-08-06 | End: 2025-08-06

## 2024-08-06 RX ORDER — PREDNISONE 50 MG/1
50 TABLET ORAL ONCE
Status: COMPLETED | OUTPATIENT
Start: 2024-08-06 | End: 2024-08-06

## 2024-08-06 RX ORDER — HYDROMORPHONE HYDROCHLORIDE 1 MG/ML
1 INJECTION, SOLUTION INTRAMUSCULAR; INTRAVENOUS; SUBCUTANEOUS ONCE
Status: COMPLETED | OUTPATIENT
Start: 2024-08-06 | End: 2024-08-06

## 2024-08-06 RX ORDER — DIPHENHYDRAMINE HYDROCHLORIDE 50 MG/ML
25 INJECTION INTRAMUSCULAR; INTRAVENOUS ONCE
Status: COMPLETED | OUTPATIENT
Start: 2024-08-06 | End: 2024-08-06

## 2024-08-06 ASSESSMENT — PAIN DESCRIPTION - PAIN TYPE
TYPE: CHRONIC PAIN
TYPE: ACUTE PAIN

## 2024-08-06 ASSESSMENT — PAIN DESCRIPTION - LOCATION
LOCATION: BACK
LOCATION: BACK

## 2024-08-06 ASSESSMENT — PAIN DESCRIPTION - PROGRESSION
CLINICAL_PROGRESSION: GRADUALLY WORSENING
CLINICAL_PROGRESSION: GRADUALLY IMPROVING

## 2024-08-06 ASSESSMENT — PAIN SCALES - GENERAL
PAINLEVEL_OUTOF10: 5 - MODERATE PAIN
PAINLEVEL_OUTOF10: 10 - WORST POSSIBLE PAIN
PAINLEVEL_OUTOF10: 5 - MODERATE PAIN

## 2024-08-06 ASSESSMENT — PAIN - FUNCTIONAL ASSESSMENT
PAIN_FUNCTIONAL_ASSESSMENT: 0-10
PAIN_FUNCTIONAL_ASSESSMENT: 0-10

## 2024-08-06 ASSESSMENT — PAIN DESCRIPTION - FREQUENCY: FREQUENCY: CONSTANT/CONTINUOUS

## 2024-08-06 ASSESSMENT — PAIN DESCRIPTION - DESCRIPTORS: DESCRIPTORS: ACHING

## 2024-08-06 ASSESSMENT — PAIN DESCRIPTION - ORIENTATION: ORIENTATION: LOWER

## 2024-08-06 ASSESSMENT — PAIN DESCRIPTION - ONSET: ONSET: GRADUAL

## 2024-08-06 NOTE — ED PROVIDER NOTES
HPI   Chief Complaint   Patient presents with    Back Pain     Hx of chronic back issues, sciatic issues, fused portions of back, radiates down right leg, feet are normally swollen- but seem more so since Saturday, no bowel or urine function issues, no new trauma, pretty sure aggervated it Saturday being out all day more then usual.        Patient presents to the emergency department complaining of back pain that radiates down both legs.  She states that she was at a wedding on Saturday in which she was on her feet more than is usual for her.  She reports that her low back pain has been severely worsened since then.  She denies loss of control of bowels or bladder.  She reports that her left leg has been hurting slightly more than her right which is unusual.  She denies any new trauma.  She has continued to ambulate with her cane as is usual.              Patient History   Past Medical History:   Diagnosis Date    Personal history of other complications of pregnancy, childbirth and the puerperium     History of spontaneous     Personal history of urinary calculi 2016    History of renal calculi     Past Surgical History:   Procedure Laterality Date    LITHOTRIPSY  2016    Renal Lithotripsy     Family History   Problem Relation Name Age of Onset    Other (bypass) Father      Heart disease Brother      Breast cancer Daughter          dion    Breast cancer Daughter       Social History     Tobacco Use    Smoking status: Some Days     Current packs/day: 0.50     Types: Cigarettes    Smokeless tobacco: Never   Substance Use Topics    Alcohol use: Never    Drug use: Never       Physical Exam   ED Triage Vitals [24 0714]   Temperature Heart Rate Respirations BP   37.3 °C (99.1 °F) 99 18 (!) 166/99      Pulse Ox Temp Source Heart Rate Source Patient Position   98 % Oral Monitor Sitting      BP Location FiO2 (%)     Right arm --       Physical Exam  HENT:      Head: Normocephalic.   Eyes:       General: No scleral icterus.  Musculoskeletal:      Comments: Lumbar paraspinal tenderness to palpation.   Neurological:      Mental Status: She is alert.      Comments: Pain with movement, motor and sensation present although slightly reduced, but at baseline           ED Course & MDM   Diagnoses as of 08/06/24 0734   Chronic low back pain with bilateral sciatica, unspecified back pain laterality                       [place] Coma Scale Score: 15                        Medical Decision Making  Patient treated for acute exacerbation of chronic back pain with sciatica.  My suspicion for cauda equina syndrome, epidural abscess, epidural hematoma, spinal cord compression is very low.  Patient was able to ambulate in the emergency department.  Patient advised to follow-up with primary care physician as well as spine specialist.  Return precautions given for any worsening symptoms.  Parts of this chart were completed with dictation software, please excuse any errors in transcription.        Procedure  Procedures     Brandyn Pryor MD  08/06/24 0900

## 2024-08-06 NOTE — DISCHARGE INSTRUCTIONS
You should follow-up with your primary care physician as well as pain management specialist.    It is important to remember that your care does not end here and you must continue to monitor your condition closely. Please return to the emergency department for any worsening or concerning signs or symptoms as directed by our conversations and the discharge instructions. If you do not have a doctor please contact the referral number on your discharge instructions. Please contact any physician specialists provided in your discharge notes as it is very important to follow up with them regarding your condition. If you are unable to reach the physicians provided, please come back to the Emergency Department at any time.    Return to emergency room without delay for ANY new or worsening pains or for any other symptoms or concerns.  Return with worsening pains, nausea, vomiting, trouble breathing, palpitations, shortness of breath, inability to pass stool or urine, loss of control of stool or urine, any numbness or tingling (that is not normal for you), uncontrolled fevers, the passing of blood or other material in stool or urine, rashes, pains or for any other symptoms or concerns you may have.  You are always welcome to return to the ER at any time for any reason or for any other concerns you may have.

## 2024-08-07 DIAGNOSIS — F41.9 ANXIETY: ICD-10-CM

## 2024-08-07 RX ORDER — GABAPENTIN 800 MG/1
800 TABLET ORAL 3 TIMES DAILY
Qty: 270 TABLET | Refills: 1 | Status: CANCELLED | OUTPATIENT
Start: 2024-08-07 | End: 2025-08-07

## 2024-08-07 NOTE — TELEPHONE ENCOUNTER
Pt called stating that the [facility] pharmacy is close due to not having power. Pt would like to know if she could get script changed to [facility] instead. Pt would like a call when medications be sent.  Pt last seen on 2/8/2024.

## 2024-08-08 ENCOUNTER — APPOINTMENT (OUTPATIENT)
Dept: PHYSICAL THERAPY | Facility: CLINIC | Age: 49
End: 2024-08-08
Payer: COMMERCIAL

## 2024-08-08 DIAGNOSIS — M51.27 OTHER INTERVERTEBRAL DISC DISPLACEMENT, LUMBOSACRAL REGION: ICD-10-CM

## 2024-08-08 PROCEDURE — 97113 AQUATIC THERAPY/EXERCISES: CPT | Mod: GP

## 2024-08-08 NOTE — PROGRESS NOTES
"     Physical Therapy Treatment     Patient Name: Janae Roman  MRN: [id number]  Encounter date:  8/8/2024  Time Calculation  Start Time: 0745  Stop Time: 0830  Time Calculation (min): 45 min  PT Therapeutic Procedures Time Entry  Aquatic Therapy Time Entry: 40     Visit Number:  10/12 (including evaluation)  Planned total visits: 12  Visits Authorized/Insurance Coverage:  [facility] [id number], M51.27 APPROVED,  12V AQUA 2X A WEEK 05/17/2024-C9 END DATE EXTENDED TO 8/31     Current Problem  Problem List Items Addressed This Visit               ICD-10-CM     Other intervertebral disc displacement, lumbosacral region M51.27      Precautions  Precautions  Precautions Comment: Back pain hx, h/o lumbar surgery, NO TRACTION     Pain: 9/10 L lower back    Subjective  Pain level still high. Has been cooking onf \"on feet      Objective  Step to pattern with ascent of steps and heavy UE support.   Heavy L lateral shift weight bearing in to cane with gait; cued to attempt to correct     Treatment:  Walk x3 laps fwd, bwd, s/s across pool with red bar bell in each hand, submerged.      Deep, 7' with noodle under arms, BUE support:  Feet on wall stretch 15\" x 3  Decompression x2'  Hip abd/adduction x2'  XC ski x2'  Bike x2'  -second round Feet on wall stretch 15\" x 3  DKTC 1'     Repeated above x2     Water walking 2 laps ea fwd, retro, lateral before exiting pool     Assessment:   Inconsistent Pain control. Some minor reduction of pain with aquatics but patient returns to additional visits with high level of pain    Pain end of session: 7     Plan:  Continue with current POC/no changes     Assessment of current progress against goals:  Progressing toward functional goals     Goals:  Active         Outpatient PT goals         Short Term Goals         Start:  05/31/24    Expected End:  07/15/24        STG 1: Patient will be IND with basic core/hip strengthening HEP x 5 visits.  STG 2: Patient will report 10% reduction in overall back " pain symptoms x 5-6 visits.            Long Term Goals         Start:  05/31/24    Expected End:  08/29/24        LTG 1: Patient will report < 60% impairment Oswestry pain scale.  LTG 2: Patient will report LBP < 7/10 x 2 weeks for improved activity tolerance  LTG 3: Patient will report 10 minute standing endurance with no exacerbation of LBP  LTG 4: Patient will be independent with seated shower/bathing/sponge bath as sign of improved endurance from completing physical therapy.

## 2024-08-20 ENCOUNTER — APPOINTMENT (OUTPATIENT)
Dept: RADIOLOGY | Facility: HOSPITAL | Age: 49
End: 2024-08-20
Payer: COMMERCIAL

## 2024-08-20 ENCOUNTER — APPOINTMENT (OUTPATIENT)
Dept: CARDIOLOGY | Facility: HOSPITAL | Age: 49
End: 2024-08-20
Payer: COMMERCIAL

## 2024-08-20 ENCOUNTER — HOSPITAL ENCOUNTER (EMERGENCY)
Facility: HOSPITAL | Age: 49
Discharge: LEFT BEFORE DISCHARGE | End: 2024-08-20
Attending: INTERNAL MEDICINE | Admitting: INTERNAL MEDICINE
Payer: COMMERCIAL

## 2024-08-20 VITALS
TEMPERATURE: 97.5 F | HEART RATE: 86 BPM | SYSTOLIC BLOOD PRESSURE: 103 MMHG | RESPIRATION RATE: 18 BRPM | WEIGHT: 264 LBS | HEIGHT: 72 IN | BODY MASS INDEX: 35.76 KG/M2 | DIASTOLIC BLOOD PRESSURE: 68 MMHG | OXYGEN SATURATION: 95 %

## 2024-08-20 DIAGNOSIS — N17.9 ACUTE KIDNEY INJURY (CMS-HCC): Primary | ICD-10-CM

## 2024-08-20 DIAGNOSIS — R79.89 TROPONIN LEVEL ELEVATED: ICD-10-CM

## 2024-08-20 LAB
ALBUMIN SERPL-MCNC: 4.3 G/DL (ref 3.5–5)
ALP BLD-CCNC: 123 U/L (ref 35–125)
ALT SERPL-CCNC: 50 U/L (ref 5–40)
ANION GAP SERPL CALC-SCNC: 18 MMOL/L
APPEARANCE UR: CLEAR
AST SERPL-CCNC: 71 U/L (ref 5–40)
ATRIAL RATE: 100 BPM
BASOPHILS # BLD AUTO: 0.08 X10*3/UL (ref 0–0.1)
BASOPHILS NFR BLD AUTO: 0.6 %
BILIRUB SERPL-MCNC: 0.2 MG/DL (ref 0.1–1.2)
BILIRUB UR STRIP.AUTO-MCNC: NEGATIVE MG/DL
BUN SERPL-MCNC: 41 MG/DL (ref 8–25)
CALCIUM SERPL-MCNC: 9.9 MG/DL (ref 8.5–10.4)
CHLORIDE SERPL-SCNC: 99 MMOL/L (ref 97–107)
CO2 SERPL-SCNC: 21 MMOL/L (ref 24–31)
COLOR UR: YELLOW
CREAT SERPL-MCNC: 2.8 MG/DL (ref 0.4–1.6)
EGFRCR SERPLBLD CKD-EPI 2021: 20 ML/MIN/1.73M*2
EOSINOPHIL # BLD AUTO: 0.36 X10*3/UL (ref 0–0.7)
EOSINOPHIL NFR BLD AUTO: 2.8 %
ERYTHROCYTE [DISTWIDTH] IN BLOOD BY AUTOMATED COUNT: 13 % (ref 11.5–14.5)
GLUCOSE SERPL-MCNC: 122 MG/DL (ref 65–99)
GLUCOSE UR STRIP.AUTO-MCNC: NORMAL MG/DL
HCG UR QL IA.RAPID: NEGATIVE
HCT VFR BLD AUTO: 39.9 % (ref 36–46)
HGB BLD-MCNC: 13.3 G/DL (ref 12–16)
HOLD SPECIMEN: NORMAL
HYALINE CASTS #/AREA URNS AUTO: ABNORMAL /LPF
IMM GRANULOCYTES # BLD AUTO: 0.17 X10*3/UL (ref 0–0.7)
IMM GRANULOCYTES NFR BLD AUTO: 1.3 % (ref 0–0.9)
KETONES UR STRIP.AUTO-MCNC: NEGATIVE MG/DL
LEUKOCYTE ESTERASE UR QL STRIP.AUTO: NEGATIVE
LIPASE SERPL-CCNC: 53 U/L (ref 16–63)
LYMPHOCYTES # BLD AUTO: 2.76 X10*3/UL (ref 1.2–4.8)
LYMPHOCYTES NFR BLD AUTO: 21.8 %
MAGNESIUM SERPL-MCNC: 1.8 MG/DL (ref 1.6–3.1)
MCH RBC QN AUTO: 34.3 PG (ref 26–34)
MCHC RBC AUTO-ENTMCNC: 33.3 G/DL (ref 32–36)
MCV RBC AUTO: 103 FL (ref 80–100)
MONOCYTES # BLD AUTO: 0.85 X10*3/UL (ref 0.1–1)
MONOCYTES NFR BLD AUTO: 6.7 %
MUCOUS THREADS #/AREA URNS AUTO: ABNORMAL /LPF
NEUTROPHILS # BLD AUTO: 8.43 X10*3/UL (ref 1.2–7.7)
NEUTROPHILS NFR BLD AUTO: 66.8 %
NITRITE UR QL STRIP.AUTO: NEGATIVE
NRBC BLD-RTO: 0 /100 WBCS (ref 0–0)
P AXIS: 34 DEGREES
P OFFSET: 194 MS
P ONSET: 134 MS
PH UR STRIP.AUTO: 5 [PH]
PLATELET # BLD AUTO: 356 X10*3/UL (ref 150–450)
POTASSIUM SERPL-SCNC: 4.5 MMOL/L (ref 3.4–5.1)
PR INTERVAL: 178 MS
PROT SERPL-MCNC: 7.6 G/DL (ref 5.9–7.9)
PROT UR STRIP.AUTO-MCNC: ABNORMAL MG/DL
Q ONSET: 223 MS
QRS COUNT: 17 BEATS
QRS DURATION: 76 MS
QT INTERVAL: 384 MS
QTC CALCULATION(BAZETT): 495 MS
QTC FREDERICIA: 455 MS
R AXIS: 44 DEGREES
RBC # BLD AUTO: 3.88 X10*6/UL (ref 4–5.2)
RBC # UR STRIP.AUTO: NEGATIVE /UL
RBC #/AREA URNS AUTO: ABNORMAL /HPF
SODIUM SERPL-SCNC: 138 MMOL/L (ref 133–145)
SP GR UR STRIP.AUTO: 1.02
SQUAMOUS #/AREA URNS AUTO: ABNORMAL /HPF
T AXIS: 64 DEGREES
T OFFSET: 415 MS
TROPONIN T SERPL-MCNC: 57 NG/L
TROPONIN T SERPL-MCNC: 62 NG/L
UROBILINOGEN UR STRIP.AUTO-MCNC: ABNORMAL MG/DL
VENTRICULAR RATE: 100 BPM
WBC # BLD AUTO: 12.7 X10*3/UL (ref 4.4–11.3)
WBC #/AREA URNS AUTO: ABNORMAL /HPF

## 2024-08-20 PROCEDURE — 2500000004 HC RX 250 GENERAL PHARMACY W/ HCPCS (ALT 636 FOR OP/ED)

## 2024-08-20 PROCEDURE — 83735 ASSAY OF MAGNESIUM: CPT

## 2024-08-20 PROCEDURE — 93005 ELECTROCARDIOGRAM TRACING: CPT

## 2024-08-20 PROCEDURE — 71045 X-RAY EXAM CHEST 1 VIEW: CPT | Performed by: RADIOLOGY

## 2024-08-20 PROCEDURE — 74176 CT ABD & PELVIS W/O CONTRAST: CPT | Performed by: RADIOLOGY

## 2024-08-20 PROCEDURE — 84484 ASSAY OF TROPONIN QUANT: CPT

## 2024-08-20 PROCEDURE — 80053 COMPREHEN METABOLIC PANEL: CPT

## 2024-08-20 PROCEDURE — 85025 COMPLETE CBC W/AUTO DIFF WBC: CPT

## 2024-08-20 PROCEDURE — 74176 CT ABD & PELVIS W/O CONTRAST: CPT

## 2024-08-20 PROCEDURE — 99285 EMERGENCY DEPT VISIT HI MDM: CPT | Mod: 25

## 2024-08-20 PROCEDURE — 81025 URINE PREGNANCY TEST: CPT

## 2024-08-20 PROCEDURE — 36415 COLL VENOUS BLD VENIPUNCTURE: CPT

## 2024-08-20 PROCEDURE — 1210000001 HC SEMI-PRIVATE ROOM DAILY

## 2024-08-20 PROCEDURE — 71045 X-RAY EXAM CHEST 1 VIEW: CPT

## 2024-08-20 PROCEDURE — 96374 THER/PROPH/DIAG INJ IV PUSH: CPT

## 2024-08-20 PROCEDURE — 83690 ASSAY OF LIPASE: CPT

## 2024-08-20 PROCEDURE — 81001 URINALYSIS AUTO W/SCOPE: CPT

## 2024-08-20 PROCEDURE — 96361 HYDRATE IV INFUSION ADD-ON: CPT

## 2024-08-20 RX ORDER — AMITRIPTYLINE HYDROCHLORIDE 10 MG/1
10-20 TABLET, FILM COATED ORAL 2 TIMES DAILY
COMMUNITY

## 2024-08-20 RX ORDER — ACETAMINOPHEN AND IBUPROFEN 250; 125 MG/1; MG/1
3 TABLET, FILM COATED ORAL EVERY 8 HOURS
COMMUNITY

## 2024-08-20 RX ORDER — KETOROLAC TROMETHAMINE 30 MG/ML
15 INJECTION, SOLUTION INTRAMUSCULAR; INTRAVENOUS ONCE
Status: COMPLETED | OUTPATIENT
Start: 2024-08-20 | End: 2024-08-20

## 2024-08-20 SDOH — HEALTH STABILITY: MENTAL HEALTH
HOW OFTEN DO YOU NEED TO HAVE SOMEONE HELP YOU WHEN YOU READ INSTRUCTIONS, PAMPHLETS, OR OTHER WRITTEN MATERIAL FROM YOUR DOCTOR OR PHARMACY?: RARELY

## 2024-08-20 SDOH — HEALTH STABILITY: MENTAL HEALTH
STRESS IS WHEN SOMEONE FEELS TENSE, NERVOUS, ANXIOUS, OR CAN'T SLEEP AT NIGHT BECAUSE THEIR MIND IS TROUBLED. HOW STRESSED ARE YOU?: RATHER MUCH

## 2024-08-20 SDOH — ECONOMIC STABILITY: FOOD INSECURITY: WITHIN THE PAST 12 MONTHS, YOU WORRIED THAT YOUR FOOD WOULD RUN OUT BEFORE YOU GOT MONEY TO BUY MORE.: NEVER TRUE

## 2024-08-20 SDOH — ECONOMIC STABILITY: FOOD INSECURITY: WITHIN THE PAST 12 MONTHS, THE FOOD YOU BOUGHT JUST DIDN'T LAST AND YOU DIDN'T HAVE MONEY TO GET MORE.: NEVER TRUE

## 2024-08-20 SDOH — SOCIAL STABILITY: SOCIAL NETWORK: ARE YOU MARRIED, WIDOWED, DIVORCED, SEPARATED, NEVER MARRIED, OR LIVING WITH A PARTNER?: LIVING WITH PARTNER

## 2024-08-20 SDOH — ECONOMIC STABILITY: INCOME INSECURITY: IN THE PAST 12 MONTHS, HAS THE ELECTRIC, GAS, OIL, OR WATER COMPANY THREATENED TO SHUT OFF SERVICE IN YOUR HOME?: NO

## 2024-08-20 SDOH — ECONOMIC STABILITY: HOUSING INSECURITY: AT ANY TIME IN THE PAST 12 MONTHS, WERE YOU HOMELESS OR LIVING IN A SHELTER (INCLUDING NOW)?: NO

## 2024-08-20 SDOH — SOCIAL STABILITY: SOCIAL NETWORK
IN A TYPICAL WEEK, HOW MANY TIMES DO YOU TALK ON THE PHONE WITH FAMILY, FRIENDS, OR NEIGHBORS?: MORE THAN THREE TIMES A WEEK

## 2024-08-20 SDOH — HEALTH STABILITY: MENTAL HEALTH: HOW OFTEN DO YOU HAVE A DRINK CONTAINING ALCOHOL?: NEVER

## 2024-08-20 SDOH — ECONOMIC STABILITY: INCOME INSECURITY: HOW HARD IS IT FOR YOU TO PAY FOR THE VERY BASICS LIKE FOOD, HOUSING, MEDICAL CARE, AND HEATING?: NOT VERY HARD

## 2024-08-20 SDOH — HEALTH STABILITY: MENTAL HEALTH: HOW OFTEN DO YOU HAVE 6 OR MORE DRINKS ON ONE OCCASION?: NEVER

## 2024-08-20 SDOH — ECONOMIC STABILITY: INCOME INSECURITY: IN THE LAST 12 MONTHS, WAS THERE A TIME WHEN YOU WERE NOT ABLE TO PAY THE MORTGAGE OR RENT ON TIME?: NO

## 2024-08-20 SDOH — ECONOMIC STABILITY: TRANSPORTATION INSECURITY
IN THE PAST 12 MONTHS, HAS THE LACK OF TRANSPORTATION KEPT YOU FROM MEDICAL APPOINTMENTS OR FROM GETTING MEDICATIONS?: NO

## 2024-08-20 SDOH — HEALTH STABILITY: PHYSICAL HEALTH: ON AVERAGE, HOW MANY MINUTES DO YOU ENGAGE IN EXERCISE AT THIS LEVEL?: 0 MIN

## 2024-08-20 SDOH — ECONOMIC STABILITY: TRANSPORTATION INSECURITY
IN THE PAST 12 MONTHS, HAS LACK OF TRANSPORTATION KEPT YOU FROM MEETINGS, WORK, OR FROM GETTING THINGS NEEDED FOR DAILY LIVING?: NO

## 2024-08-20 SDOH — ECONOMIC STABILITY: HOUSING INSECURITY: IN THE PAST 12 MONTHS, HOW MANY TIMES HAVE YOU MOVED WHERE YOU WERE LIVING?: 1

## 2024-08-20 SDOH — HEALTH STABILITY: PHYSICAL HEALTH: ON AVERAGE, HOW MANY DAYS PER WEEK DO YOU ENGAGE IN MODERATE TO STRENUOUS EXERCISE (LIKE A BRISK WALK)?: 0 DAYS

## 2024-08-20 SDOH — SOCIAL STABILITY: SOCIAL NETWORK: HOW OFTEN DO YOU GET TOGETHER WITH FRIENDS OR RELATIVES?: TWICE A WEEK

## 2024-08-20 SDOH — HEALTH STABILITY: MENTAL HEALTH: HOW MANY STANDARD DRINKS CONTAINING ALCOHOL DO YOU HAVE ON A TYPICAL DAY?: PATIENT DOES NOT DRINK

## 2024-08-20 ASSESSMENT — LIFESTYLE VARIABLES
TOTAL SCORE: 0
EVER FELT BAD OR GUILTY ABOUT YOUR DRINKING: NO
SKIP TO QUESTIONS 9-10: 1
EVER HAD A DRINK FIRST THING IN THE MORNING TO STEADY YOUR NERVES TO GET RID OF A HANGOVER: NO
HAVE PEOPLE ANNOYED YOU BY CRITICIZING YOUR DRINKING: NO
HAVE YOU EVER FELT YOU SHOULD CUT DOWN ON YOUR DRINKING: NO
AUDIT-C TOTAL SCORE: 0

## 2024-08-20 ASSESSMENT — PAIN - FUNCTIONAL ASSESSMENT: PAIN_FUNCTIONAL_ASSESSMENT: 0-10

## 2024-08-20 ASSESSMENT — PAIN SCALES - GENERAL
PAINLEVEL_OUTOF10: 4

## 2024-08-20 ASSESSMENT — PAIN DESCRIPTION - PROGRESSION: CLINICAL_PROGRESSION: NOT CHANGED

## 2024-08-20 ASSESSMENT — PAIN DESCRIPTION - LOCATION: LOCATION: ABDOMEN

## 2024-08-20 NOTE — ED NOTES
Pt states she woke up this morning feeling abnormal. She checked her BP and it was 78/71. She sates she feels cloudy and can't think straight. She said this is the way she felt the last time right before she had a heart attack back in 2022.     Antonieta Pichardo, EMT  08/20/24 0905

## 2024-08-20 NOTE — PROGRESS NOTES
08/20/24 1626   Current Planned Discharge Disposition   Current Planned Discharge Disposition Home

## 2024-08-20 NOTE — ED NOTES
Pt states she wants to leave [facility] pt states she needs to smoke a cigarette. Pt states she is not good to be admitted to hospital pt states she understands the risk leaving [facility]     Stanley Rodrigues RN  08/20/24 [id number]

## 2024-08-20 NOTE — PROGRESS NOTES
08/20/24 1626   [facility] Disability Status   Are you deaf or do you have serious difficulty hearing? N   Are you blind or do you have serious difficulty seeing, even when wearing glasses? N   Because of a physical, mental, or emotional condition, do you have serious difficulty concentrating, remembering, or making decisions? (5 years old or older) N   Do you have serious difficulty walking or climbing stairs? N   Do you have serious difficulty dressing or bathing? N   Because of a physical, mental, or emotional condition, do you have serious difficulty doing errands alone such as visiting the doctor? N

## 2024-08-20 NOTE — ED PROVIDER NOTES
HPI   Chief Complaint   Patient presents with    Hypotension       HPI  Patient is a 49-year-old female presenting for evaluation of episode of hypertension.  Patient states that she overall was not feeling very well yesterday and reports that her  thought she was slightly lethargic and not acting herself.  She endorses very vague symptoms.  She states that she woke up this morning and still did not feel very well so she took her blood pressure and found it to be low with systolics in the 70s thus she presented to ER for further evaluation.  She otherwise denies chest pain, shortness of breath, cough, congestion, sore throat, neck pain or stiffness, fevers, abdominal pain, nausea, vomiting, headaches or focal weakness.  No acute complaints otherwise.      Patient History   Past Medical History:   Diagnosis Date    Personal history of other complications of pregnancy, childbirth and the puerperium     History of spontaneous     Personal history of urinary calculi 2016    History of renal calculi     Past Surgical History:   Procedure Laterality Date    LITHOTRIPSY  2016    Renal Lithotripsy     Family History   Problem Relation Name Age of Onset    Other (bypass) Father      Heart disease Brother      Breast cancer Daughter          dion    Breast cancer Daughter       Social History     Tobacco Use    Smoking status: Some Days     Current packs/day: 0.50     Types: Cigarettes    Smokeless tobacco: Never   Substance Use Topics    Alcohol use: Never    Drug use: Never       Physical Exam   ED Triage Vitals [24 0858]   Temperature Heart Rate Respirations BP   36.4 °C (97.5 °F) (!) 106 18 121/69      Pulse Ox Temp Source Heart Rate Source Patient Position   95 % Temporal -- Sitting      BP Location FiO2 (%)     Left arm --       Physical Exam  Vitals and nursing note reviewed.   Constitutional:       General: She is not in acute distress.     Appearance: She is well-developed.       Comments: Resting in exam chair in no apparent distress.  Ambulatory   HENT:      Head: Normocephalic and atraumatic.   Eyes:      Conjunctiva/sclera: Conjunctivae normal.   Cardiovascular:      Rate and Rhythm: Normal rate and regular rhythm.      Heart sounds: No murmur heard.  Pulmonary:      Effort: Pulmonary effort is normal. No respiratory distress.      Breath sounds: Normal breath sounds.      Comments: Clear to auscultation bilaterally  Abdominal:      Palpations: Abdomen is soft.      Tenderness: There is no abdominal tenderness.   Musculoskeletal:         General: No swelling.      Cervical back: Neck supple.   Skin:     General: Skin is warm and dry.      Capillary Refill: Capillary refill takes less than 2 seconds.   Neurological:      Mental Status: She is alert.      Comments: Awake and alert oriented to person place and time coherent and providing history.  Ambulatory with steady gait.  Moving extremities symmetrically without weakness.  No abnormal coordination or sensory deficit.  No facial droop or slurred speech.  NIH stroke scale score of 0.   Psychiatric:         Mood and Affect: Mood normal.           ED Course & MDM   ED Course as of 08/21/24 2315   Tue Aug 20, 2024   0933 EKG interpreted by me: Normal sinus rhythm, rate 100.  Normal axis.  No ST or T wave abnormalities. [ML]   1510 Sepsis not suspected as the patient's white blood cell count is chronically mildly elevated based on lab history and patient has no evidence of infectious process on workup [JJ]      ED Course User Index  [JJ] Nay Durbin PA-C  [ML] Brandyn Pryor MD         Diagnoses as of 08/21/24 2315   Acute kidney injury (CMS-[facility])   Troponin level elevated                 No data recorded     [place] Coma Scale Score: 15 (08/20/24 0912 : Mariangel Kelly LPN)                           Medical Decision Making  Parts of this chart have been completed using voice recognition software. Please excuse any errors of transcription.   My thought process and reason for plan has been formulated from the time that I saw the patient until the time of disposition and is not specific to one specific moment during their visit and furthermore my MDM encompasses this entire chart and not only this text box.      HPI: Detailed above.    Exam: A medically appropriate exam performed, outlined above, given the known history and presentation.    History obtained from: Patient    EKG: Interpreted by attending physician and reviewed by me    Social Determinants of Health considered during this visit: Lives independently with family    Medications given during visit:  Medications   ketorolac (Toradol) injection 15 mg (15 mg intravenous Given 8/20/24 1106)   sodium chloride 0.9 % bolus 500 mL (0 mL intravenous Stopped 8/20/24 1321)        Diagnostic/tests  Labs Reviewed   COMPREHENSIVE METABOLIC PANEL - Abnormal       Result Value    Glucose 122 (*)     Sodium 138      Potassium 4.5      Chloride 99      Bicarbonate 21 (*)     Urea Nitrogen 41 (*)     Creatinine 2.80 (*)     eGFR 20 (*)     Calcium 9.9      Albumin 4.3      Alkaline Phosphatase 123      Total Protein 7.6      AST 71 (*)     Bilirubin, Total 0.2      ALT 50 (*)     Anion Gap 18     CBC WITH AUTO DIFFERENTIAL - Abnormal    WBC 12.7 (*)     nRBC 0.0      RBC 3.88 (*)     Hemoglobin 13.3      Hematocrit 39.9       (*)     MCH 34.3 (*)     MCHC 33.3      RDW 13.0      Platelets 356      Neutrophils % 66.8      Immature Granulocytes %, Automated 1.3 (*)     Lymphocytes % 21.8      Monocytes % 6.7      Eosinophils % 2.8      Basophils % 0.6      Neutrophils Absolute 8.43 (*)     Immature Granulocytes Absolute, Automated 0.17      Lymphocytes Absolute 2.76      Monocytes Absolute 0.85      Eosinophils Absolute 0.36      Basophils Absolute 0.08     URINALYSIS WITH REFLEX CULTURE AND MICROSCOPIC - Abnormal    Color, Urine Yellow      Appearance, Urine Clear      Specific Gravity, Urine 1.022       pH, Urine 5.0      Protein, Urine 20 (TRACE)      Glucose, Urine Normal      Blood, Urine NEGATIVE      Ketones, Urine NEGATIVE      Bilirubin, Urine NEGATIVE      Urobilinogen, Urine 2 (1+) (*)     Nitrite, Urine NEGATIVE      Leukocyte Esterase, Urine NEGATIVE     SERIAL TROPONIN, INITIAL ([facility]) - Abnormal    Troponin T, High Sensitivity 57 (*)    SERIAL TROPONIN,  2 HOUR ([facility]) - Abnormal    Troponin T, High Sensitivity 62 (*)    URINALYSIS MICROSCOPIC WITH REFLEX CULTURE - Abnormal    WBC, Urine 1-5      RBC, Urine 1-2      Squamous Epithelial Cells, Urine 1-9 (SPARSE)      Mucus, Urine 1+      Hyaline Casts, Urine 4+ (*)    MAGNESIUM - Normal    Magnesium 1.80     LIPASE - Normal    Lipase 53     HCG, URINE, QUALITATIVE - Normal    HCG, Urine NEGATIVE     BLOOD CULTURE   BLOOD CULTURE   URINALYSIS WITH REFLEX CULTURE AND MICROSCOPIC    Narrative:     The following orders were created for panel order Urinalysis with Reflex Culture and Microscopic.  Procedure                               Abnormality         Status                     ---------                               -----------         ------                     Urinalysis with Reflex C...[[id number]]  Abnormal            Final result               Extra Urine Gray Tube[[id number]]                            Final result                 Please view results for these tests on the individual orders.   TROPONIN T SERIES, HIGH SENSITIVITY (0, 2 HR, 6 HR)    Narrative:     The following orders were created for panel order Troponin T Series, High Sensitivity (0, 2HR, 6HR).  Procedure                               Abnormality         Status                     ---------                               -----------         ------                     Serial Troponin, Initial...[[id number]]  Abnormal            Final result               Serial Troponin, 2 Hour ...[[id number]]  Abnormal            Final result               Serial Troponin, 6 Hour ...[[id number]]                                                    Please view results for these tests on the individual orders.   EXTRA URINE GRAY TUBE    Extra Tube Hold for add-ons.        CT abdomen pelvis wo IV contrast   Final Result   1. No acute process within the abdomen or pelvis.   2. Tiny nonobstructing stone about the inferior pole of the left   kidney.   3. Fatty liver.        MACRO:   None.        Signed by: Manny Carmichael 8/20/2024 11:52 AM   Dictation workstation:   [id number]      XR chest 1 view   Final Result   No evidence of acute intrathoracic abnormality.        Signed by: Bryce Ag 8/20/2024 10:50 AM   Dictation workstation:   [id number]           Considerations/further MDM:  Patient is a 49-year-old female presenting for evaluation of low blood pressure    Patient awake and alert oriented without focal neurologic deficit.  Ambulatory during the visit.  Answering questions appropriately and following commands.  Vital signs with normal blood pressure, afebrile with  mild tachycardia upon arrival.  Nontoxic-appearing.    EKG without evidence of acute ischemia    Laboratory workup is significant for mild leukocytosis, and also an acute kidney injury and mild transaminitis.  No evidence of urinary tract infection and chest x-ray without evidence of pneumonia.    Initial troponin T 57.  Repeat troponin T 62.  Slight uptrending in cardiac enzymes.    CT abdomen pelvis was pursued without contrast due to acute renal failure without evidence of acute intra-abdominal pathology.    No infectious etiology identified at this time.  My suspicion for acute infection or sepsis is low.    I did speak with the on-call hospitalist Dr. Sullivan.  We discussed patient case.  Was admitted to medical management for further evaluation of her acute kidney injury and repeat cardiac enzymes.  Patient was agreeable with this plan of care.  Remained hemodynamically stable during the ER visit.      Procedure  Procedures     Nay Durbin  IRASEMA  08/21/24 6206

## 2024-08-20 NOTE — PROGRESS NOTES
Pharmacy Medication History Review    Janae Roman is a 49 y.o. female admitted for Acute kidney injury (CMS-HCC). Pharmacy reviewed the patient's [id number] medications and allergies for accuracy.    Medications ADDED:  Amitriptyline 10mg - BID 1 AM/2PM  Dual action  Medications CHANGED:  Metoprolol succinate 100mg - 1/2 tablet BID  Omeprazole 40mg   Oxycodone/acetaminophen 5-325mg   Medications REMOVED:   Metoprolol 25mg sucicnate    Metformin 500mg   Topiramate 100mg     The list below reflects the updated PTA list. Comments regarding how patient may be taking medications differently can be found in the Admit Orders Activity  Prior to Admission Medications   Prescriptions Last Dose Informant   amitriptyline (Elavil) 10 mg tablet 8/20/2024 Self   Sig: Take 1-2 tablets (10-20 mg) by mouth 2 times a day. 1 tablet in the morning / 2 tablet at night   atorvastatin (Lipitor) 80 mg tablet 8/20/2024 Self   Sig: TAKE 1 TABLET BY MOUTH EVERY DAY   busPIRone (Buspar) 10 mg tablet 8/20/2024 Self   Sig: TAKE 2 TABLETS BY MOUTH TWICE A DAY   gabapentin (Neurontin) 800 mg tablet 8/20/2024 Self   Sig: Take 1 tablet (800 mg) by mouth 3 times a day.   ibuprofen-acetaminophen (AdviL Dual Action) 125-250 mg tablet per tablet 8/20/2024 Self   Sig: Take 3 tablets by mouth every 8 hours.   levothyroxine (Synthroid, Levoxyl) 125 mcg tablet 8/20/2024 Self   Sig: TAKE 1 TABLET BY MOUTH EVERY DAY IN MORNING ON EMPTY STOMACH 90   lisinopriL-hydrochlorothiazide 20-12.5 mg tablet 8/20/2024 Self   Sig: Take 1 tablet by mouth once daily.   metoprolol succinate XL (Toprol-XL) 100 mg 24 hr tablet  Self   Sig: TAKE 1 TABLET IN THE MORNING AND TAKE 1/2 TABLET IN THE EVENING ORALLY EVERY DAY 90 DAYS   Patient taking differently: 0.5 tablets (50 mg) 2 times a day.   nitroglycerin (Nitrodur) 0.2 mg/hr patch Unknown Self   Sig: Place 1 patch over 12 hours on the skin every 12 hours.   nitroglycerin (Nitrostat) 0.4 mg SL tablet Unknown Self    Sig: Place 1 tablet (0.4 mg) under the tongue every 5 minutes if needed for chest pain.   omeprazole (PriLOSEC) 40 mg DR capsule 8/20/2024 Self   Sig: Take 1 capsule (40 mg) by mouth once daily in the morning. Take before meals. 30 MINUTES BEFORE MORNING MEAL FOR 30 DAYS Orally Once a day for 30 days   oxyCODONE-acetaminophen (Percocet) 7.5-325 mg tablet 8/20/2024 Self   Sig: Take 1 tablet by mouth every 6 hours if needed for severe pain (7 - 10).   tiZANidine (Zanaflex) 4 mg tablet 8/20/2024 Self   Sig: Take 1 tablet (4 mg) by mouth 3 times a day as needed.      Facility-Administered Medications: None        The list below reflects the updated allergy list. Please review each documented allergy for additional clarification and justification.  Allergies  Reviewed by Mariangel Kelly LPN on 8/20/2024        Severity Reactions Comments    Corticosteroids (glucocorticoids) High Hives, Itching, Shortness of breath Pt does not know which steroid she was taking.    Levofloxacin High Rash Red kyra syndrome    Penicillins High Shortness of breath     Hydrocodone-acetaminophen Not Specified Hives     Methylprednisolone Not Specified Hives, Nausea/vomiting States she takes benadryl with it and it works    Promethazine Not Specified Hives     Vancomycin Hcl Not Specified Hives     Morphine Low Hives, Rash             Pharmacy has been updated to [facility].    Sources used to complete the med history include dispense history, PTA medication list, patient interview. Patient is a fair historian.    Below are additional concerns with the patient's PTA list.  None     Mariana Harp, Sarai-ADV  Please reach out via [employer] Secure Chat for questions

## 2024-08-20 NOTE — PROGRESS NOTES
TCC met with patient at the bedside. PT lives in a apartment with sridevi and son. There is 1 step to enter and no steps inside. Pt is not on oxygen or dialysis. Pt uses a cane and a walker. Pt drives and can shop, cook and clean but sometimes needs help. There are no services in the home. PT smoke 1 ppd cigarettes and does not drink alcohol. Pt is not a diabetic. PCP is Maria A Baker. Pharmacy of choice is [facility]. Pt is not a . Pt does not have a LW or POA. Pt states she is leaving and not going up to the floor. Pt will not have any skilled needs at the time of discharge.     DISCHARGE PLAN TO RETURN HOME NO SKILLED NEEDS.        08/20/24 0754   Discharge Planning   Living Arrangements Children;Spouse/significant other   Support Systems Spouse/significant other   Assistance Needed NONE   Type of Residence Private residence   Who is requesting discharge planning? Provider   Home or Post Acute Services None   Expected Discharge Disposition Home   Does the patient need discharge transport arranged? No   Financial Resource Strain   How hard is it for you to pay for the very basics like food, housing, medical care, and heating? Not very   Housing Stability   In the last 12 months, was there a time when you were not able to pay the mortgage or rent on time? N   In the past 12 months, how many times have you moved where you were living? 1   At any time in the past 12 months, were you homeless or living in a shelter (including now)? N   Transportation Needs   In the past 12 months, has lack of transportation kept you from medical appointments or from getting medications? no   In the past 12 months, has lack of transportation kept you from meetings, work, or from getting things needed for daily living? No   Patient Choice   Provider Choice list and CMS website (https://medicare.gov/care-compare#search) for post-acute Quality and Resource Measure Data were provided and reviewed with: Patient   Patient /  Family choosing to utilize agency / facility established prior to hospitalization No

## 2024-08-21 ENCOUNTER — TREATMENT (OUTPATIENT)
Dept: PHYSICAL THERAPY | Facility: CLINIC | Age: 49
End: 2024-08-21
Payer: COMMERCIAL

## 2024-08-21 DIAGNOSIS — M51.27 OTHER INTERVERTEBRAL DISC DISPLACEMENT, LUMBOSACRAL REGION: ICD-10-CM

## 2024-08-21 PROCEDURE — 97113 AQUATIC THERAPY/EXERCISES: CPT | Mod: GP,CQ

## 2024-08-21 NOTE — Clinical Note
August 26, 2024    Antonio Giron DO  [address]  [place] [address]    Patient: Janae Roman   YOB: 1975   Date of Visit: 8/21/2024       Dear Antonio Giron DO  [address] [address]    The attached plan of care is being sent to you because your patient’s medical reimbursement requires that you certify the plan of care. Your signature is required to allow uninterrupted insurance coverage.      You may indicate your approval by signing below and faxing this form back to us at Dept Fax: [phone].    Please call Dept: [phone] with any questions or concerns.    Thank you for this referral,        Caro Fraser PTA  [facility]  [address] [place] [address]    Payer: Payor: [employer] / Plan: [employer] / Product Type: *No Product type* /                                                                         Date:     Ignacia Fraser PTA,     Re: Ms. Janae Romna, MRN:[id number]    I certify that I have reviewed the attached plan of care and it is medically necessary for Ms. Janae Roman (1975) who is under my care.          ______________________________________                    _________________  Provider name and credentials                                           Date and time                                                                                           Plan of Care 8/26/24   Effective from: 8/26/2024  Effective to: 8/26/2024    Plan ID: [id number]            Participants as of Finalize on 8/26/2024    Name Type Comments Contact Info    Antonio Giron DO Referring Provider Last tx 8/21.  Patient discharged due to change in medical status [phone]    Nikita Khan, PT Physical Therapist  [phone]       Last Plan Note     Author: Caro Fraser PTA Status: Signed Last edited: 8/21/2024  7:30 AM           Physical Therapy Treatment    Patient Name: Janae  "Abel  MRN: [id number]  Encounter date:  8/21/2024  Time Calculation  Start Time: 0749  Stop Time: 0815  Time Calculation (min): 26 min     PT Therapeutic Procedures Time Entry  Aquatic Therapy Time Entry: 23    Visit Number:  11 (including evaluation)  Planned total visits: 12  Visits Authorized/Insurance Coverage:  [facility] [id number], M51.27 APPROVED,  12V AQUA 2X A WEEK 05/17/2024-07/31/2024     Current Problem  Problem List Items Addressed This Visit             ICD-10-CM    Other intervertebral disc displacement, lumbosacral region M51.27       Precautions   Back pain hx, h/o lumbar surgery, NO TRACTION     General:     Pain   9/10    Subjective  \"My upper back is tight.\" \"I left AMA from the ED yesterday. They wanted to keep me after I told them I was leaving. They didn't communicate with me. Didn't give me a call button. I had to wave them down. Nothing to eat or drink. They took my blood three times and didn't tell me why.\" \"I am going to call my doctor today.\" \"I got up at three AM and put epsom salt in the bathtub because my back hurt so much. That is were I slept.\"     Objective  The patient presents a little shaky this date. No SOB. Alert and oriented.     Treatment:     Aquatic therapy    Deep, 5' with noodle under arms, BUE support:    -Decompression x2'  -Bike x1'  -Decompression x2'  -Hip abd/adduction x1'  Decompression x2'  XC ski x1'  Decompression x2'  Feet on wall stretch 15\" x 3  -Decompression x2'  -Bike x1'  -Decompression x2'  -Hip abd/adduction x1'  -Decompression x2'    Assessment:   Late arrival today. Short session. Due to pain level. Walking deferred. Due to shakiness upon presenting to PT pt kept in 5 ft depth for safety.   Areas of improvements:  Decreased pain  Limitations/deficits:  Weakness and Pain limits sleeping    Pain end of session:  7.5    Plan:     Continue with current POC/no changes    Assessment of current progress against goals:  Symptomatic relief with treatment "     Goals:  Active       Outpatient PT goals       Short Term Goals       Start:  05/31/24    Expected End:  07/15/24       STG 1: Patient will be IND with basic core/hip strengthening HEP x 5 visits.  STG 2: Patient will report 10% reduction in overall back pain symptoms x 5-6 visits.          Long Term Goals       Start:  05/31/24    Expected End:  08/29/24       LTG 1: Patient will report < 60% impairment Oswestry pain scale.  LTG 2: Patient will report LBP < 7/10 x 2 weeks for improved activity tolerance  LTG 3: Patient will report 10 minute standing endurance with no exacerbation of LBP  LTG 4: Patient will be independent with seated shower/bathing/sponge bath as sign of improved endurance from completing physical therapy.                       Current Participants as of 8/26/2024    Name Type Comments Contact Info    Antonio Giron DO Referring Provider Last tx 8/21.  Patient discharged due to change in medical status [phone]    Signature pending    Nikita Khan, IRASEMA Physical Therapist  [phone]    Signature pending

## 2024-08-21 NOTE — PROGRESS NOTES
"    Physical Therapy Treatment    Patient Name: Janae Roman  MRN: [id number]  Encounter date:  8/21/2024  Time Calculation  Start Time: 0749  Stop Time: 0815  Time Calculation (min): 26 min     PT Therapeutic Procedures Time Entry  Aquatic Therapy Time Entry: 23    Visit Number:  11 (including evaluation)  Planned total visits: 12  Visits Authorized/Insurance Coverage:  [facility] [id number], M51.27 APPROVED,  12V AQUA 2X A WEEK 05/17/2024-07/31/2024     Current Problem  Problem List Items Addressed This Visit             ICD-10-CM    Other intervertebral disc displacement, lumbosacral region M51.27       Precautions   Back pain hx, h/o lumbar surgery, NO TRACTION     General:     Pain   9/10    Subjective  \"My upper back is tight.\" \"I left AMA from the ED yesterday. They wanted to keep me after I told them I was leaving. They didn't communicate with me. Didn't give me a call button. I had to wave them down. Nothing to eat or drink. They took my blood three times and didn't tell me why.\" \"I am going to call my doctor today.\" \"I got up at three AM and put epsom salt in the bathtub because my back hurt so much. That is were I slept.\"     Objective  The patient presents a little shaky this date. No SOB. Alert and oriented.     Treatment:     Aquatic therapy    Deep, 5' with noodle under arms, BUE support:    -Decompression x2'  -Bike x1'  -Decompression x2'  -Hip abd/adduction x1'  Decompression x2'  XC ski x1'  Decompression x2'  Feet on wall stretch 15\" x 3  -Decompression x2'  -Bike x1'  -Decompression x2'  -Hip abd/adduction x1'  -Decompression x2'    Assessment:   Late arrival today. Short session. Due to pain level. Walking deferred. Due to shakiness upon presenting to PT pt kept in 5 ft depth for safety.   Areas of improvements:  Decreased pain  Limitations/deficits:  Weakness and Pain limits sleeping    Pain end of session:  7.5    Plan:     Continue with current POC/no changes    Assessment of current progress " against goals:  Symptomatic relief with treatment     Goals:  Active       Outpatient PT goals       Short Term Goals       Start:  05/31/24    Expected End:  07/15/24       STG 1: Patient will be IND with basic core/hip strengthening HEP x 5 visits.  STG 2: Patient will report 10% reduction in overall back pain symptoms x 5-6 visits.          Long Term Goals       Start:  05/31/24    Expected End:  08/29/24       LTG 1: Patient will report < 60% impairment Oswestry pain scale.  LTG 2: Patient will report LBP < 7/10 x 2 weeks for improved activity tolerance  LTG 3: Patient will report 10 minute standing endurance with no exacerbation of LBP  LTG 4: Patient will be independent with seated shower/bathing/sponge bath as sign of improved endurance from completing physical therapy.

## 2024-08-23 ENCOUNTER — APPOINTMENT (OUTPATIENT)
Dept: PHYSICAL THERAPY | Facility: CLINIC | Age: 49
End: 2024-08-23
Payer: COMMERCIAL

## 2024-08-23 NOTE — PROGRESS NOTES
"    Physical Therapy Treatment    Patient Name: Janae Roman  MRN: [id number]  Encounter date:  8/23/2024             Visit Number:  Visit count could not be calculated. Make sure you are using a visit which is associated with an episode. (including evaluation)  Planned total visits: 12  Visits Authorized/Insurance Coverage:  [facility] [id number], [id number] APPROVED,  12V AQUA 2X A WEEK 05/17/2024-07/31/2024     Current Problem  Problem List Items Addressed This Visit    None        Precautions   Back pain hx, h/o lumbar surgery, NO TRACTION     General:     Pain   9/10    Subjective  \"My upper back is tight.\" \"I left AMA from the ED yesterday. They wanted to keep me after I told them I was leaving. They didn't communicate with me. Didn't give me a call button. I had to wave them down. Nothing to eat or drink. They took my blood three times and didn't tell me why.\" \"I am going to call my doctor today.\" \"I got up at three AM and put epsom salt in the bathtub because my back hurt so much. That is were I slept.\"     Objective  The patient presents a little shaky this date. No SOB. Alert and oriented.     Treatment:     Aquatic therapy    Deep, 5' with noodle under arms, BUE support:    -Decompression x2'  -Bike x1'  -Decompression x2'  -Hip abd/adduction x1'  Decompression x2'  XC ski x1'  Decompression x2'  Feet on wall stretch 15\" x 3  -Decompression x2'  -Bike x1'  -Decompression x2'  -Hip abd/adduction x1'  -Decompression x2'    Assessment:   Late arrival today. Short session. Due to pain level. Walking deferred. Due to shakiness upon presenting to PT pt kept in 5 ft depth for safety.   Areas of improvements:  Decreased pain  Limitations/deficits:  Weakness and Pain limits sleeping    Pain end of session:  7.5    Plan:     Continue with current POC/no changes    Assessment of current progress against goals:  Symptomatic relief with treatment     Goals:          "

## 2024-08-26 ENCOUNTER — DOCUMENTATION (OUTPATIENT)
Dept: PRIMARY CARE | Facility: CLINIC | Age: 49
End: 2024-08-26
Payer: COMMERCIAL

## 2024-08-26 ENCOUNTER — PATIENT OUTREACH (OUTPATIENT)
Dept: PRIMARY CARE | Facility: CLINIC | Age: 49
End: 2024-08-26
Payer: COMMERCIAL

## 2024-08-26 ENCOUNTER — DOCUMENTATION (OUTPATIENT)
Dept: PHYSICAL THERAPY | Facility: CLINIC | Age: 49
End: 2024-08-26
Payer: COMMERCIAL

## 2024-08-26 NOTE — PROGRESS NOTES
Physical Therapy/PT discharge note                 Therapy Communication Note    Patient Name: Janae Roman  MRN: [id number]  Today's Date: 8/26/2024     Discipline: Physical Therapy    Missed Visit Reason:      Missed Time: Cancel    Comment:  Physical Therapy discontinued at this time due to medical status: patient admitted to hospital.

## 2024-08-26 NOTE — PROGRESS NOTES
Discharge Facility: [facility]  Discharge Diagnosis: Acute Hypotension  Admission Date: 08/21/2024  Discharge Date: 08/24/2024    PCP Appointment Date: Tasked to office for scheduling  Specialist Appointment Date: None  Hospital Encounter and Summary Linked: Yes    See discharge assessment below for further details    Medications  Medications reviewed with patient/caregiver?: Yes (8/26/2024 11:52 AM)  Is the patient having any side effects they believe may be caused by any medication additions or changes?: No (8/26/2024 11:52 AM)  Does the patient have all medications ordered at discharge?: Yes (8/26/2024 11:52 AM)  Prescription Comments: Scripts given at discharge for Synthroid, Cortef and Gabapentin (8/26/2024 11:52 AM)  Is the patient taking all medications as directed (includes completed medication regime)?: Yes (8/26/2024 11:52 AM)  Medication Comments: Patient denies any issues obtaining or affording medication (8/26/2024 11:52 AM)    Appointments  Does the patient have a primary care provider?: Yes (8/26/2024 11:52 AM)  Has the patient kept scheduled appointments due by today?: Yes (8/26/2024 11:52 AM)    Self Management  What is the home health agency?: N/A (8/26/2024 11:52 AM)  What Durable Medical Equipment (DME) was ordered?: N/A (8/26/2024 11:52 AM)    Patient Teaching  Does the patient have access to their discharge instructions?: Yes (8/26/2024 11:52 AM)  Care Management Interventions: Reviewed instructions with patient (8/26/2024 11:52 AM)  What is the patient's perception of their health status since discharge?: Improving (8/26/2024 11:52 AM)  Is the patient/caregiver able to teach back the hierarchy of who to call/visit for symptoms/problems? PCP, Specialist, Home Health nurse, Urgent Care, ED, 911: Yes (8/26/2024 11:52 AM)  Patient/Caregiver Education Comments: CM spoke to patient via phone. She states that she is doing better. She has all discharge medication at the home. PCP follow up has  been tasked to the office for scheduling. She has no questions at this time and was thankful for this call. (8/26/2024 11:52 AM)

## 2024-09-09 ENCOUNTER — PATIENT OUTREACH (OUTPATIENT)
Dept: PRIMARY CARE | Facility: CLINIC | Age: 49
End: 2024-09-09
Payer: COMMERCIAL

## 2024-09-09 NOTE — PROGRESS NOTES
14 day call to patient after hospitalization. Patient did not see PCP within 14 days of discharge.   At time of outreach call the patient feels as if their condition has improved since last visit.    Patient states that she has numbness in her legs. She has PCP appointment on 09/18/2024.

## 2024-09-18 ENCOUNTER — APPOINTMENT (OUTPATIENT)
Dept: PRIMARY CARE | Facility: CLINIC | Age: 49
End: 2024-09-18
Payer: COMMERCIAL

## 2024-09-20 ENCOUNTER — OFFICE VISIT (OUTPATIENT)
Dept: PRIMARY CARE | Facility: CLINIC | Age: 49
End: 2024-09-20
Payer: COMMERCIAL

## 2024-09-20 VITALS
WEIGHT: 284.32 LBS | DIASTOLIC BLOOD PRESSURE: 84 MMHG | HEART RATE: 96 BPM | OXYGEN SATURATION: 97 % | BODY MASS INDEX: 38.56 KG/M2 | SYSTOLIC BLOOD PRESSURE: 144 MMHG

## 2024-09-20 DIAGNOSIS — M54.17 LUMBOSACRAL RADICULOPATHY: ICD-10-CM

## 2024-09-20 DIAGNOSIS — Z01.419 ENCOUNTER FOR WELL WOMAN EXAM: ICD-10-CM

## 2024-09-20 DIAGNOSIS — E06.3 HYPOTHYROIDISM DUE TO HASHIMOTO'S THYROIDITIS: Primary | ICD-10-CM

## 2024-09-20 DIAGNOSIS — N17.9 AKI (ACUTE KIDNEY INJURY) (CMS-HCC): ICD-10-CM

## 2024-09-20 DIAGNOSIS — R10.13 EPIGASTRIC PAIN: ICD-10-CM

## 2024-09-20 DIAGNOSIS — M54.50 ACUTE BILATERAL LOW BACK PAIN WITHOUT SCIATICA: ICD-10-CM

## 2024-09-20 DIAGNOSIS — E03.8 HYPOTHYROIDISM DUE TO HASHIMOTO'S THYROIDITIS: Primary | ICD-10-CM

## 2024-09-20 DIAGNOSIS — I95.0 IDIOPATHIC HYPOTENSION: ICD-10-CM

## 2024-09-20 PROBLEM — F43.23 ADJUSTMENT DISORDER WITH MIXED ANXIETY AND DEPRESSED MOOD: Status: RESOLVED | Noted: 2023-11-01 | Resolved: 2024-09-20

## 2024-09-20 PROBLEM — S39.012A STRAIN OF LUMBAR REGION: Status: RESOLVED | Noted: 2023-11-01 | Resolved: 2024-09-20

## 2024-09-20 PROBLEM — G43.909 MIGRAINE: Status: RESOLVED | Noted: 2023-11-01 | Resolved: 2024-09-20

## 2024-09-20 PROBLEM — D72.829 LEUKOCYTOSIS: Status: RESOLVED | Noted: 2023-11-01 | Resolved: 2024-09-20

## 2024-09-20 PROBLEM — I21.4 NSTEMI (NON-ST ELEVATED MYOCARDIAL INFARCTION) (MULTI): Status: RESOLVED | Noted: 2023-11-01 | Resolved: 2024-09-20

## 2024-09-20 PROBLEM — I95.9 ACUTE HYPOTENSION: Status: RESOLVED | Noted: 2024-08-21 | Resolved: 2024-09-20

## 2024-09-20 PROBLEM — I21.4 ACUTE NON-ST ELEVATION MYOCARDIAL INFARCTION (NSTEMI) (MULTI): Status: RESOLVED | Noted: 2024-09-20 | Resolved: 2024-09-20

## 2024-09-20 PROBLEM — I10 HTN (HYPERTENSION): Status: ACTIVE | Noted: 2024-08-21

## 2024-09-20 PROBLEM — N20.0 CALCULUS OF KIDNEY: Status: RESOLVED | Noted: 2024-09-20 | Resolved: 2024-09-20

## 2024-09-20 PROBLEM — E78.2 MIXED HYPERLIPIDEMIA: Status: RESOLVED | Noted: 2023-11-01 | Resolved: 2024-09-20

## 2024-09-20 PROBLEM — E87.20 LACTIC ACIDOSIS: Status: RESOLVED | Noted: 2024-09-20 | Resolved: 2024-09-20
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
## 2024-09-20 PROBLEM — L08.9 INFECTED SEBACEOUS CYST: Status: RESOLVED | Noted: 2024-09-20 | Resolved: 2024-09-20

## 2024-09-20 PROBLEM — L72.3 INFECTED SEBACEOUS CYST: Status: RESOLVED | Noted: 2024-09-20 | Resolved: 2024-09-20

## 2024-09-20 PROBLEM — S02.5XXA FRACTURE OF TOOTH: Status: RESOLVED | Noted: 2024-09-20 | Resolved: 2024-09-20

## 2024-09-20 PROBLEM — F41.0 PANIC ATTACK: Status: RESOLVED | Noted: 2023-11-01 | Resolved: 2024-09-20

## 2024-09-20 PROBLEM — E87.1 HYPONATREMIA: Status: RESOLVED | Noted: 2023-11-01 | Resolved: 2024-09-20

## 2024-09-20 PROBLEM — M51.27 OTHER INTERVERTEBRAL DISC DISPLACEMENT, LUMBOSACRAL REGION: Status: RESOLVED | Noted: 2024-05-31 | Resolved: 2024-09-20

## 2024-09-20 PROBLEM — E78.5 DYSLIPIDEMIA: Status: ACTIVE | Noted: 2024-09-20

## 2024-09-20 PROBLEM — L70.0 CYSTIC ACNE: Status: ACTIVE | Noted: 2024-09-20

## 2024-09-20 PROBLEM — N18.30 STAGE 3 CHRONIC KIDNEY DISEASE (MULTI): Status: ACTIVE | Noted: 2024-09-20

## 2024-09-20 PROBLEM — E66.9 OBESITY WITH BODY MASS INDEX 30 OR GREATER: Status: ACTIVE | Noted: 2024-09-20

## 2024-09-20 PROBLEM — K59.03 DRUG-INDUCED CONSTIPATION: Status: RESOLVED | Noted: 2023-11-01 | Resolved: 2024-09-20

## 2024-09-20 PROBLEM — M51.06 INTERVERTEBRAL DISC DISORDER OF LUMBAR REGION WITH MYELOPATHY: Status: ACTIVE | Noted: 2023-04-04

## 2024-09-20 PROBLEM — G92.9 TOXIC ENCEPHALOPATHY: Status: RESOLVED | Noted: 2024-08-22 | Resolved: 2024-09-20

## 2024-09-20 PROBLEM — Z86.16 PERSONAL HISTORY OF COVID-19: Status: ACTIVE | Noted: 2022-08-30

## 2024-09-20 PROCEDURE — 99214 OFFICE O/P EST MOD 30 MIN: CPT | Performed by: PHYSICIAN ASSISTANT

## 2024-09-20 ASSESSMENT — ENCOUNTER SYMPTOMS
WEAKNESS: 0
SHORTNESS OF BREATH: 0
BACK PAIN: 1
NUMBNESS: 1

## 2024-09-20 ASSESSMENT — PATIENT HEALTH QUESTIONNAIRE - PHQ9
2. FEELING DOWN, DEPRESSED OR HOPELESS: NOT AT ALL
1. LITTLE INTEREST OR PLEASURE IN DOING THINGS: NOT AT ALL
SUM OF ALL RESPONSES TO PHQ9 QUESTIONS 1 AND 2: 0

## 2024-09-20 ASSESSMENT — PAIN SCALES - GENERAL: PAINLEVEL: 5
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
## 2024-09-30 ENCOUNTER — TELEPHONE (OUTPATIENT)
Dept: PRIMARY CARE | Facility: CLINIC | Age: 49
End: 2024-09-30

## 2024-09-30 ENCOUNTER — LAB (OUTPATIENT)
Dept: LAB | Facility: LAB | Age: 49
End: 2024-09-30
Payer: COMMERCIAL

## 2024-09-30 ENCOUNTER — HOSPITAL ENCOUNTER (OUTPATIENT)
Dept: RADIOLOGY | Facility: HOSPITAL | Age: 49
Discharge: HOME | End: 2024-09-30
Payer: COMMERCIAL

## 2024-09-30 DIAGNOSIS — E06.3 HYPOTHYROIDISM DUE TO HASHIMOTO'S THYROIDITIS: ICD-10-CM

## 2024-09-30 DIAGNOSIS — D72.828 NEUTROPHILIA: Primary | ICD-10-CM

## 2024-09-30 DIAGNOSIS — N17.9 AKI (ACUTE KIDNEY INJURY) (CMS-HCC): ICD-10-CM

## 2024-09-30 DIAGNOSIS — E03.9 ACQUIRED HYPOTHYROIDISM: ICD-10-CM

## 2024-09-30 DIAGNOSIS — M54.17 LUMBOSACRAL RADICULOPATHY: ICD-10-CM

## 2024-09-30 DIAGNOSIS — I95.0 IDIOPATHIC HYPOTENSION: ICD-10-CM

## 2024-09-30 LAB
ALBUMIN SERPL BCP-MCNC: 4.7 G/DL (ref 3.4–5)
ALP SERPL-CCNC: 87 U/L (ref 33–110)
ALT SERPL W P-5'-P-CCNC: 52 U/L (ref 7–45)
ANION GAP SERPL CALCULATED.3IONS-SCNC: 18 MMOL/L (ref 10–20)
AST SERPL W P-5'-P-CCNC: 46 U/L (ref 9–39)
BASOPHILS # BLD AUTO: 0.09 X10*3/UL (ref 0–0.1)
BASOPHILS NFR BLD AUTO: 0.7 %
BILIRUB SERPL-MCNC: 0.3 MG/DL (ref 0–1.2)
BUN SERPL-MCNC: 30 MG/DL (ref 6–23)
CALCIUM SERPL-MCNC: 10.5 MG/DL (ref 8.6–10.3)
CHLORIDE SERPL-SCNC: 103 MMOL/L (ref 98–107)
CO2 SERPL-SCNC: 24 MMOL/L (ref 21–32)
CREAT SERPL-MCNC: 1.05 MG/DL (ref 0.5–1.05)
EGFRCR SERPLBLD CKD-EPI 2021: 65 ML/MIN/1.73M*2
EOSINOPHIL # BLD AUTO: 0.4 X10*3/UL (ref 0–0.7)
EOSINOPHIL NFR BLD AUTO: 3.2 %
ERYTHROCYTE [DISTWIDTH] IN BLOOD BY AUTOMATED COUNT: 12.9 % (ref 11.5–14.5)
GLUCOSE SERPL-MCNC: 140 MG/DL (ref 74–99)
HCT VFR BLD AUTO: 42.3 % (ref 36–46)
HGB BLD-MCNC: 14.4 G/DL (ref 12–16)
IMM GRANULOCYTES # BLD AUTO: 0.2 X10*3/UL (ref 0–0.7)
IMM GRANULOCYTES NFR BLD AUTO: 1.6 % (ref 0–0.9)
LYMPHOCYTES # BLD AUTO: 2.65 X10*3/UL (ref 1.2–4.8)
LYMPHOCYTES NFR BLD AUTO: 21.4 %
MCH RBC QN AUTO: 35.2 PG (ref 26–34)
MCHC RBC AUTO-ENTMCNC: 34 G/DL (ref 32–36)
MCV RBC AUTO: 103 FL (ref 80–100)
MONOCYTES # BLD AUTO: 1.08 X10*3/UL (ref 0.1–1)
MONOCYTES NFR BLD AUTO: 8.7 %
NEUTROPHILS # BLD AUTO: 7.96 X10*3/UL (ref 1.2–7.7)
NEUTROPHILS NFR BLD AUTO: 64.4 %
NRBC BLD-RTO: 0 /100 WBCS (ref 0–0)
PLATELET # BLD AUTO: 409 X10*3/UL (ref 150–450)
POTASSIUM SERPL-SCNC: 4.7 MMOL/L (ref 3.5–5.3)
PROT SERPL-MCNC: 7.4 G/DL (ref 6.4–8.2)
RBC # BLD AUTO: 4.09 X10*6/UL (ref 4–5.2)
SODIUM SERPL-SCNC: 140 MMOL/L (ref 136–145)
T3FREE SERPL-MCNC: 2.7 PG/ML (ref 2.3–4.2)
T4 FREE SERPL-MCNC: 0.66 NG/DL (ref 0.61–1.12)
T4 FREE SERPL-MCNC: 0.67 NG/DL (ref 0.61–1.12)
TSH SERPL-ACNC: 25.53 MIU/L (ref 0.44–3.98)
WBC # BLD AUTO: 12.4 X10*3/UL (ref 4.4–11.3)

## 2024-09-30 PROCEDURE — 36415 COLL VENOUS BLD VENIPUNCTURE: CPT

## 2024-09-30 PROCEDURE — 72110 X-RAY EXAM L-2 SPINE 4/>VWS: CPT

## 2024-09-30 PROCEDURE — 84481 FREE ASSAY (FT-3): CPT

## 2024-09-30 PROCEDURE — 72110 X-RAY EXAM L-2 SPINE 4/>VWS: CPT | Performed by: RADIOLOGY

## 2024-09-30 NOTE — TELEPHONE ENCOUNTER
Orders placed. SUPER important that she get these labs rechecked, do not brush it under the rug as if the levels are still abnormal, could be concerning for other things; however, suspect that likely it is due to steroid at this time.

## 2024-09-30 NOTE — TELEPHONE ENCOUNTER
Please let pt know that thyroid is still slightly undertreated and would recommend increasing dose to 137mcg. If agreeable, will send to pharmacy. Will need to repeat test in 6 weeks. I would also like to repeat her CBC in 6 weeks as well. She does have elevated WBC count and immature granulocytes which could be due to the recent steroids she took, but my other concern would be infection. If she is feeling okay with no symptoms (cough, shortness of breath, burning w/urination, frequent urination, diarrhea, open skin wounds, etc.) then we can just repeat testing at the same time that we recheck TSH. Otherwise, if she is having any of those symptoms, please schedule her an in-office visit in a same day slot on Thursday or Friday.

## 2024-09-30 NOTE — TELEPHONE ENCOUNTER
Called and spoke with pt, who verbally understands. Pt agrees to the increase of thyroid dosage, please send it to [facility] in [place]. Pt also states that she has been taking steroid lately, has not had any symptoms is feeling alright. Pt agrees to rechecking labs in 6 weeks.

## 2024-10-01 DIAGNOSIS — E03.9 ACQUIRED HYPOTHYROIDISM: ICD-10-CM

## 2024-10-01 RX ORDER — LEVOTHYROXINE SODIUM 125 UG/1
TABLET ORAL
Qty: 90 TABLET | Refills: 1 | OUTPATIENT
Start: 2024-10-01

## 2024-10-01 NOTE — TELEPHONE ENCOUNTER
See rx   Last seen 09/20/24  Patient agrees to increase dose to 137mcg.  Please send to [facility] in [place].

## 2024-10-02 RX ORDER — LEVOTHYROXINE SODIUM 137 UG/1
137 TABLET ORAL DAILY
Qty: 30 TABLET | Refills: 11 | Status: SHIPPED | OUTPATIENT
Start: 2024-10-02 | End: 2025-10-02

## 2024-10-02 NOTE — TELEPHONE ENCOUNTER
Called the pt and gave message she verbally understood. Pt states thyroid medication for 137mcg was not sent please send

## 2024-10-03 ENCOUNTER — TELEPHONE (OUTPATIENT)
Dept: PRIMARY CARE | Facility: CLINIC | Age: 49
End: 2024-10-03

## 2024-10-03 ENCOUNTER — LAB (OUTPATIENT)
Dept: LAB | Facility: LAB | Age: 49
End: 2024-10-03
Payer: COMMERCIAL

## 2024-10-03 ENCOUNTER — PATIENT OUTREACH (OUTPATIENT)
Dept: PRIMARY CARE | Facility: CLINIC | Age: 49
End: 2024-10-03

## 2024-10-03 DIAGNOSIS — E06.3 HASHIMOTO'S THYROIDITIS: ICD-10-CM

## 2024-10-03 DIAGNOSIS — E03.9 ACQUIRED HYPOTHYROIDISM: ICD-10-CM

## 2024-10-03 DIAGNOSIS — D72.828 NEUTROPHILIA: Primary | ICD-10-CM

## 2024-10-03 LAB
T4 FREE SERPL-MCNC: 0.88 NG/DL (ref 0.61–1.12)
TSH SERPL-ACNC: 16.12 MIU/L (ref 0.44–3.98)

## 2024-10-03 PROCEDURE — 84439 ASSAY OF FREE THYROXINE: CPT

## 2024-10-03 PROCEDURE — 84443 ASSAY THYROID STIM HORMONE: CPT

## 2024-10-03 NOTE — PROGRESS NOTES
Successful outreach to patient regarding hospitalization as patient continues TCM program.   At time of outreach call the patient feels as if their condition has improved since initial visit with PCP or specialist.  Questions or concerns addressed at this time with patient.   Provided contact information to patient if any further non-emergent needs arise.

## 2024-10-03 NOTE — TELEPHONE ENCOUNTER
Pt was not supposed to have labs checked for another 6 weeks. Way too soon to repeat. Needs to go back in 6 weeks.

## 2024-10-03 NOTE — TELEPHONE ENCOUNTER
Please let pt know that XR was essentially normal, did not show any abnormalities of hardware or new fractures since hospitalization.

## 2024-10-04 NOTE — TELEPHONE ENCOUNTER
Would recommend discussing this with her pain management doctor as I suspect it's likely coming from her back; however, if she would like to see neurology as well, I'm happy to place a referral for her, does not have to be Dr. Keith.

## 2024-10-04 NOTE — TELEPHONE ENCOUNTER
If pt does get referred to neurology, she does not want to see Dr. Moon, as pt states that he lied to her before.

## 2024-10-04 NOTE — TELEPHONE ENCOUNTER
Called and spoke with pt, who will speak with pain management. Pt will call back if she wants a referral to neurology.

## 2024-10-04 NOTE — TELEPHONE ENCOUNTER
Pt called back and verbally understands. Pt states that she is still having numbness in both her legs. Pt would like to know what the next steps should be.

## 2024-10-15 ENCOUNTER — TELEPHONE (OUTPATIENT)
Dept: PRIMARY CARE | Facility: CLINIC | Age: 49
End: 2024-10-15
Payer: COMMERCIAL

## 2024-10-15 NOTE — TELEPHONE ENCOUNTER
Pt is having a flare up of hidradenitis and requesting medication for it. Pt states was given 7 months ago and does remember name of medication. She states it was a medication and a body wash

## 2024-10-15 NOTE — TELEPHONE ENCOUNTER
She needs an appointment for this as I do not recall ever evaluating her for this in the past. Okay to take a same day.

## 2024-10-16 ENCOUNTER — OFFICE VISIT (OUTPATIENT)
Dept: URGENT CARE | Age: 49
End: 2024-10-16
Payer: COMMERCIAL

## 2024-10-16 VITALS
WEIGHT: 284 LBS | RESPIRATION RATE: 20 BRPM | BODY MASS INDEX: 38.47 KG/M2 | DIASTOLIC BLOOD PRESSURE: 71 MMHG | HEART RATE: 113 BPM | OXYGEN SATURATION: 97 % | SYSTOLIC BLOOD PRESSURE: 131 MMHG | TEMPERATURE: 98.6 F | HEIGHT: 72 IN

## 2024-10-16 DIAGNOSIS — H60.93 OTITIS EXTERNA OF BOTH EARS, UNSPECIFIED CHRONICITY, UNSPECIFIED TYPE: ICD-10-CM

## 2024-10-16 DIAGNOSIS — H66.91 RIGHT OTITIS MEDIA, UNSPECIFIED OTITIS MEDIA TYPE: Primary | ICD-10-CM

## 2024-10-16 RX ORDER — DOXYCYCLINE 100 MG/1
100 CAPSULE ORAL 2 TIMES DAILY
Qty: 20 CAPSULE | Refills: 0 | Status: SHIPPED | OUTPATIENT
Start: 2024-10-16 | End: 2024-10-26

## 2024-10-16 RX ORDER — NEOMYCIN SULFATE, POLYMYXIN B SULFATE, HYDROCORTISONE 3.5; 10000; 1 MG/ML; [USP'U]/ML; MG/ML
3 SOLUTION/ DROPS AURICULAR (OTIC) 4 TIMES DAILY
Qty: 10 ML | Refills: 0 | Status: SHIPPED | OUTPATIENT
Start: 2024-10-16 | End: 2024-10-26

## 2024-10-16 NOTE — PROGRESS NOTES
Subjective   Patient ID: Janae Roman is a 49 y.o. female. They present today with a chief complaint of Earache (Right ear for the past 2 days ).    History of Present Illness  Janae Roman is a 49 y.o. female who presents to the clinic for right ear pain and left ear discomfort.  Associated symptoms are pain, drainage from right ear.  Pt states this has been ongoing for the past 2 days. Pt took OTC ear drops with no relief.. Pt denies any chest pain, sob, N/V at this time in clinic.         Earache   Associated symptoms include ear discharge.       Past Medical History  Allergies as of 10/16/2024 - Reviewed 10/16/2024   Allergen Reaction Noted    Corticosteroids (glucocorticoids) Hives, Itching, and Shortness of breath 2016    Levofloxacin Rash 2023    Penicillins Shortness of breath 2023    Hydrocodone-acetaminophen Hives 2023    Methylprednisolone Hives and Nausea/vomiting 2023    Promethazine Hives 2023    Vancomycin hcl Hives 2023    Morphine Hives and Rash 2023       (Not in a hospital admission)       Past Medical History:   Diagnosis Date    Acute hypotension 2024    Acute non-ST elevation myocardial infarction (NSTEMI) (Multi) 2024    Calculus of kidney 2024    Fracture of tooth 2024    Infected sebaceous cyst 2024    Lactic acidosis 2024    Personal history of other complications of pregnancy, childbirth and the puerperium     History of spontaneous     Personal history of urinary calculi 2016    History of renal calculi    Toxic encephalopathy 2024       Past Surgical History:   Procedure Laterality Date    LITHOTRIPSY  2016    Renal Lithotripsy        reports that she has been smoking cigarettes. She has never used smokeless tobacco. She reports that she does not drink alcohol and does not use drugs.    Review of Systems  Review of Systems   HENT:  Positive for ear discharge and ear pain.     All other systems reviewed and are negative.                                 Objective    Vitals:    10/16/24 0809   BP: 131/71   Pulse: (!) 113   Resp: 20   Temp: 37 °C (98.6 °F)   TempSrc: Oral   SpO2: 97%   Weight: 129 kg (284 lb)   Height: 1.829 m (6')     No LMP recorded (lmp unknown). Patient is perimenopausal.    Physical Exam  Constitutional:       Appearance: Normal appearance.   HENT:      Right Ear: Tympanic membrane is erythematous.      Ears:      Comments: Right left canal narrowing noted, tender on tragus bilateral.  Neurological:      General: No focal deficit present.      Mental Status: She is alert and oriented to person, place, and time. Mental status is at baseline.         Procedures    Point of Care Test & Imaging Results from this visit  No results found for this visit on 10/16/24.   No results found.    Diagnostic study results (if any) were reviewed by LASHONDA Mckeon.    Assessment/Plan   Allergies, medications, history, and pertinent labs/EKGs/Imaging reviewed by LAHSONDA Mckeon.     Medical Decision Making   signs and symptoms consistent with acute otitis externa.  No evidence of mastoiditis. Patient given antibiotic drops. Patient advised to return to clinic or present to ED if symptoms change or worsen. Otherwise follow-up with PCP. Patient verbalized understanding and agrees with plan.      History and examination consistent with acute uncomplicated Otitis media. No evidence of TM perforation, mastoiditis, or sepsis. Counseled patient/family on treatment plan with oral antibiotics and supportive measures at home. Return to clinic or present to ED if symptoms change or worsen. Otherwise follow-up with PCP. Patient verbalized understanding and agrees with plan.     -pt states she is ok with the cortisporin as long as she takes benadryl. Advised pt if any reaction to stop and call her PCP or if severe, go to ER. Pt verbalized understanding.     Orders and  Diagnoses  Diagnoses and all orders for this visit:  Right otitis media, unspecified otitis media type  -     doxycycline (Vibramycin) 100 mg capsule; Take 1 capsule (100 mg) by mouth 2 times a day for 10 days. Take with at least 8 ounces (large glass) of water, do not lie down for 30 minutes after  Otitis externa of both ears, unspecified chronicity, unspecified type  -     neomycin-polymyxin-HC (Cortisporin) otic solution; Administer 3 drops into each ear 4 times a day for 10 days.      Medical Admin Record      Patient disposition: Home    Please follow up with your primary provider within one week if symptoms do not improve.  You may schedule an appointment online at [email or web address] or call [phone]. Go to the Emergency Department if symptoms significantly worsen or if you develop chest pain or shortness of breath.    Electronically signed by DAISY Mckeon-MAHI  8:35 AM

## 2024-10-16 NOTE — PATIENT INSTRUCTIONS
Doxy  Cortisporin- take benadryl as prescribed.  If worsening symptoms, please go to ER    Please follow up with your primary provider within one week if symptoms do not improve.  You may schedule an appointment online at [email or web address] or call [phone]. Go to the Emergency Department if symptoms significantly worsen or if you develop chest pain or shortness of breath.

## 2024-10-21 ENCOUNTER — OFFICE VISIT (OUTPATIENT)
Dept: PRIMARY CARE | Facility: CLINIC | Age: 49
End: 2024-10-21
Payer: COMMERCIAL

## 2024-10-21 VITALS
HEART RATE: 102 BPM | DIASTOLIC BLOOD PRESSURE: 82 MMHG | OXYGEN SATURATION: 97 % | SYSTOLIC BLOOD PRESSURE: 142 MMHG | BODY MASS INDEX: 39.33 KG/M2 | WEIGHT: 290 LBS

## 2024-10-21 DIAGNOSIS — L73.2 HIDRADENITIS SUPPURATIVA: Primary | ICD-10-CM

## 2024-10-21 DIAGNOSIS — Z76.89 ENCOUNTER TO ESTABLISH CARE: ICD-10-CM

## 2024-10-21 PROCEDURE — 3077F SYST BP >= 140 MM HG: CPT | Performed by: PHYSICIAN ASSISTANT

## 2024-10-21 PROCEDURE — 99213 OFFICE O/P EST LOW 20 MIN: CPT | Performed by: PHYSICIAN ASSISTANT

## 2024-10-21 PROCEDURE — 4004F PT TOBACCO SCREEN RCVD TLK: CPT | Performed by: PHYSICIAN ASSISTANT

## 2024-10-21 PROCEDURE — 3079F DIAST BP 80-89 MM HG: CPT | Performed by: PHYSICIAN ASSISTANT

## 2024-10-21 RX ORDER — SPIRONOLACTONE 50 MG/1
50 TABLET, FILM COATED ORAL 2 TIMES DAILY
Qty: 60 TABLET | Refills: 11 | Status: SHIPPED | OUTPATIENT
Start: 2024-10-21 | End: 2025-10-21

## 2024-10-21 RX ORDER — CLINDAMYCIN PHOSPHATE 10 MG/G
GEL TOPICAL 2 TIMES DAILY
Qty: 30 G | Refills: 1 | Status: SHIPPED | OUTPATIENT
Start: 2024-10-21 | End: 2025-10-21

## 2024-10-21 RX ORDER — DOXYCYCLINE 100 MG/1
100 CAPSULE ORAL 2 TIMES DAILY
Qty: 60 CAPSULE | Refills: 1 | Status: SHIPPED | OUTPATIENT
Start: 2024-10-21 | End: 2024-12-20

## 2024-10-21 ASSESSMENT — ENCOUNTER SYMPTOMS
CHILLS: 0
SHORTNESS OF BREATH: 0
FEVER: 0
PALPITATIONS: 0
WOUND: 1

## 2024-10-21 ASSESSMENT — PATIENT HEALTH QUESTIONNAIRE - PHQ9
SUM OF ALL RESPONSES TO PHQ9 QUESTIONS 1 AND 2: 0
2. FEELING DOWN, DEPRESSED OR HOPELESS: NOT AT ALL
1. LITTLE INTEREST OR PLEASURE IN DOING THINGS: NOT AT ALL

## 2024-10-21 ASSESSMENT — PAIN SCALES - GENERAL: PAINLEVEL_OUTOF10: 2

## 2024-10-21 NOTE — PROGRESS NOTES
Subjective   Patient ID: Janae Roman is a 49 y.o. female who presents for Wound Check (Pt would like to discuss the sores in her armpits, under her breast and groin, which appears  /nr ).    Wound Check       Went to  10/16 for R ear pain and was started on doxycycline. She has a hx of hidradenitis suppurativa and has a recent acute flare. Lesions in her groin are improved, but has the start of a new lump in her R axilla. Lesions are associated w/tunneling. Has never seen dermatology. Previously tx w/clindamycin + chlorhexidine.    Requesting referral to  + women's health.    Review of Systems   Constitutional:  Negative for fever.   Skin:  Positive for wound.       Objective   /82   Pulse 102   Wt 132 kg (290 lb)   LMP  (LMP Unknown)   SpO2 97%   BMI 39.33 kg/m²     Physical Exam  Constitutional:       Appearance: Normal appearance.   HENT:      Head: Normocephalic and atraumatic.   Eyes:      Extraocular Movements: Extraocular movements intact.      Conjunctiva/sclera: Conjunctivae normal.   Cardiovascular:      Rate and Rhythm: Tachycardia present.   Pulmonary:      Effort: Pulmonary effort is normal.   Musculoskeletal:      Cervical back: Normal range of motion and neck supple.   Skin:     General: Skin is warm.      Comments: R axilla w/palpable lump, evidence of previous infection w/scarring + tunneling   Neurological:      General: No focal deficit present.      Mental Status: She is alert.         Assessment/Plan   Problem List Items Addressed This Visit    None  Visit Diagnoses         Codes    Hidradenitis suppurativa    -  Primary L73.2    Relevant Medications    spironolactone (Aldactone) 50 mg tablet    clindamycin (Cleocin T) 1 % gel    doxycycline (Vibramycin) 100 mg capsule    Encounter to establish care     Z76.89    Relevant Orders    Referral to Gastroenterology    Referral to Obstetrics / Gynecology

## 2024-10-21 NOTE — PROGRESS NOTES
Subjective   Patient ID: Janae Roman is a 49 y.o. female who presents for Wound Check (Pt would like to discuss the sores in her armpits, under her breast and groin, which appears  /nr ).    Wound Check    Pt presents with 1 week of Hidradenitis suppurativa flare in her right axilla. Her left axilla groin, and under bilateral breasts recently had a flare that are resolving. Currently uses an oil that she bought online for Hidradenitis suppurativa but states that the smell makes her nauseous and triggers a migraine so she does not like to use it often. Denies any fever, chills, or fatigue. She was given PO Clindamycin and Chlorhexidine Gluconate body wash in 03/24 for a flare.     She was diagnosed with Otitis Media in right ear on 10/16/24. Currently on Doxycycline 100 mg PO BID. She reports some improvement in her ear pain since starting the medication but states that she still has difficulty with hearing.    Review of Systems   Constitutional:  Negative for chills and fever.   HENT:  Positive for ear pain. Negative for congestion and ear discharge.    Respiratory:  Negative for shortness of breath.    Cardiovascular:  Negative for chest pain and palpitations.   Skin:         Hidradenitis suppurativa in intertriginous areas       Objective   /82   Pulse 102   Wt 132 kg (290 lb)   LMP  (LMP Unknown)   SpO2 97%   BMI 39.33 kg/m²     Physical Exam  Constitutional:       Appearance: Normal appearance.   HENT:      Right Ear: Ear canal normal. A middle ear effusion is present.      Left Ear: Tympanic membrane and ear canal normal.   Cardiovascular:      Rate and Rhythm: Normal rate and regular rhythm.      Heart sounds: Normal heart sounds. No murmur heard.     No friction rub. No gallop.   Skin:     General: Skin is warm.      Findings: No abscess or erythema.      Comments: R axilla Hydradenitis Suppurativa  with single abscess formation with scarring and no skin tunnels   Neurological:      Mental  Status: She is alert.       Assessment/Plan   Problem List Items Addressed This Visit    None  Hidradenitis suppurativa - Encouraged tobacco cessation and weight loss to help reduce the recurrence rates. Continue Doxycycline for 1 month, start Spironolactone and topical Clindamycin. Refer to dermatology for further management.    R ear OM - On doxycyline.

## 2024-10-25 ENCOUNTER — APPOINTMENT (OUTPATIENT)
Dept: CARDIOLOGY | Facility: HOSPITAL | Age: 49
End: 2024-10-25
Payer: COMMERCIAL

## 2024-10-25 ENCOUNTER — HOSPITAL ENCOUNTER (EMERGENCY)
Facility: HOSPITAL | Age: 49
Discharge: HOME | End: 2024-10-25
Attending: EMERGENCY MEDICINE
Payer: COMMERCIAL

## 2024-10-25 VITALS
HEART RATE: 80 BPM | TEMPERATURE: 97.9 F | RESPIRATION RATE: 15 BRPM | OXYGEN SATURATION: 98 % | DIASTOLIC BLOOD PRESSURE: 66 MMHG | SYSTOLIC BLOOD PRESSURE: 112 MMHG | WEIGHT: 290 LBS | BODY MASS INDEX: 39.28 KG/M2 | HEIGHT: 72 IN

## 2024-10-25 DIAGNOSIS — T50.901A ACCIDENTAL OVERDOSE, INITIAL ENCOUNTER: Primary | ICD-10-CM

## 2024-10-25 LAB
ALBUMIN SERPL BCP-MCNC: 3.3 G/DL (ref 3.4–5)
ALP SERPL-CCNC: 78 U/L (ref 33–110)
ALT SERPL W P-5'-P-CCNC: 56 U/L (ref 7–45)
AMPHETAMINES UR QL SCN: ABNORMAL
ANION GAP SERPL CALCULATED.3IONS-SCNC: 12 MMOL/L (ref 10–20)
APAP SERPL-MCNC: <10 UG/ML
APPEARANCE UR: ABNORMAL
AST SERPL W P-5'-P-CCNC: 54 U/L (ref 9–39)
BACTERIA #/AREA URNS AUTO: ABNORMAL /HPF
BARBITURATES UR QL SCN: ABNORMAL
BASOPHILS # BLD AUTO: 0.11 X10*3/UL (ref 0–0.1)
BASOPHILS NFR BLD AUTO: 0.8 %
BENZODIAZ UR QL SCN: ABNORMAL
BILIRUB SERPL-MCNC: 0.3 MG/DL (ref 0–1.2)
BILIRUB UR STRIP.AUTO-MCNC: NEGATIVE MG/DL
BUN SERPL-MCNC: 24 MG/DL (ref 6–23)
BZE UR QL SCN: ABNORMAL
CALCIUM SERPL-MCNC: 8.4 MG/DL (ref 8.6–10.3)
CANNABINOIDS UR QL SCN: ABNORMAL
CHLORIDE SERPL-SCNC: 111 MMOL/L (ref 98–107)
CO2 SERPL-SCNC: 22 MMOL/L (ref 21–32)
COLOR UR: YELLOW
CREAT SERPL-MCNC: 1.03 MG/DL (ref 0.5–1.05)
EGFRCR SERPLBLD CKD-EPI 2021: 66 ML/MIN/1.73M*2
EOSINOPHIL # BLD AUTO: 0.59 X10*3/UL (ref 0–0.7)
EOSINOPHIL NFR BLD AUTO: 4.3 %
ERYTHROCYTE [DISTWIDTH] IN BLOOD BY AUTOMATED COUNT: 12.5 % (ref 11.5–14.5)
ETHANOL SERPL-MCNC: <10 MG/DL
FENTANYL+NORFENTANYL UR QL SCN: ABNORMAL
GLUCOSE SERPL-MCNC: 178 MG/DL (ref 74–99)
GLUCOSE UR STRIP.AUTO-MCNC: NORMAL MG/DL
HCG UR QL IA.RAPID: NEGATIVE
HCT VFR BLD AUTO: 42.2 % (ref 36–46)
HGB BLD-MCNC: 14.2 G/DL (ref 12–16)
HOLD SPECIMEN: NORMAL
HYALINE CASTS #/AREA URNS AUTO: ABNORMAL /LPF
IMM GRANULOCYTES # BLD AUTO: 0.38 X10*3/UL (ref 0–0.7)
IMM GRANULOCYTES NFR BLD AUTO: 2.8 % (ref 0–0.9)
KETONES UR STRIP.AUTO-MCNC: NEGATIVE MG/DL
LEUKOCYTE ESTERASE UR QL STRIP.AUTO: NEGATIVE
LYMPHOCYTES # BLD AUTO: 2.79 X10*3/UL (ref 1.2–4.8)
LYMPHOCYTES NFR BLD AUTO: 20.5 %
MCH RBC QN AUTO: 34.9 PG (ref 26–34)
MCHC RBC AUTO-ENTMCNC: 33.6 G/DL (ref 32–36)
MCV RBC AUTO: 104 FL (ref 80–100)
METHADONE UR QL SCN: ABNORMAL
MONOCYTES # BLD AUTO: 1.24 X10*3/UL (ref 0.1–1)
MONOCYTES NFR BLD AUTO: 9.1 %
MUCOUS THREADS #/AREA URNS AUTO: ABNORMAL /LPF
NEUTROPHILS # BLD AUTO: 8.52 X10*3/UL (ref 1.2–7.7)
NEUTROPHILS NFR BLD AUTO: 62.5 %
NITRITE UR QL STRIP.AUTO: NEGATIVE
NRBC BLD-RTO: 0 /100 WBCS (ref 0–0)
OPIATES UR QL SCN: ABNORMAL
OXYCODONE+OXYMORPHONE UR QL SCN: ABNORMAL
PCP UR QL SCN: ABNORMAL
PH UR STRIP.AUTO: 6.5 [PH]
PLATELET # BLD AUTO: 414 X10*3/UL (ref 150–450)
POTASSIUM SERPL-SCNC: 4.5 MMOL/L (ref 3.5–5.3)
PROT SERPL-MCNC: 6 G/DL (ref 6.4–8.2)
PROT UR STRIP.AUTO-MCNC: ABNORMAL MG/DL
RBC # BLD AUTO: 4.07 X10*6/UL (ref 4–5.2)
RBC # UR STRIP.AUTO: NEGATIVE /UL
RBC #/AREA URNS AUTO: ABNORMAL /HPF
SALICYLATES SERPL-MCNC: <3 MG/DL
SODIUM SERPL-SCNC: 140 MMOL/L (ref 136–145)
SP GR UR STRIP.AUTO: 1.02
SQUAMOUS #/AREA URNS AUTO: ABNORMAL /HPF
UROBILINOGEN UR STRIP.AUTO-MCNC: NORMAL MG/DL
WBC # BLD AUTO: 13.6 X10*3/UL (ref 4.4–11.3)
WBC #/AREA URNS AUTO: ABNORMAL /HPF

## 2024-10-25 PROCEDURE — 2500000004 HC RX 250 GENERAL PHARMACY W/ HCPCS (ALT 636 FOR OP/ED)

## 2024-10-25 PROCEDURE — 80307 DRUG TEST PRSMV CHEM ANLYZR: CPT | Performed by: EMERGENCY MEDICINE

## 2024-10-25 PROCEDURE — 81001 URINALYSIS AUTO W/SCOPE: CPT | Mod: 59 | Performed by: EMERGENCY MEDICINE

## 2024-10-25 PROCEDURE — 81025 URINE PREGNANCY TEST: CPT | Performed by: EMERGENCY MEDICINE

## 2024-10-25 PROCEDURE — 36415 COLL VENOUS BLD VENIPUNCTURE: CPT | Performed by: EMERGENCY MEDICINE

## 2024-10-25 PROCEDURE — 99283 EMERGENCY DEPT VISIT LOW MDM: CPT

## 2024-10-25 PROCEDURE — 2500000004 HC RX 250 GENERAL PHARMACY W/ HCPCS (ALT 636 FOR OP/ED): Performed by: EMERGENCY MEDICINE

## 2024-10-25 PROCEDURE — 80320 DRUG SCREEN QUANTALCOHOLS: CPT | Performed by: EMERGENCY MEDICINE

## 2024-10-25 PROCEDURE — 80053 COMPREHEN METABOLIC PANEL: CPT | Performed by: EMERGENCY MEDICINE

## 2024-10-25 PROCEDURE — 93005 ELECTROCARDIOGRAM TRACING: CPT

## 2024-10-25 PROCEDURE — 85025 COMPLETE CBC W/AUTO DIFF WBC: CPT | Performed by: EMERGENCY MEDICINE

## 2024-10-25 ASSESSMENT — LIFESTYLE VARIABLES
EVER FELT BAD OR GUILTY ABOUT YOUR DRINKING: NO
HAVE PEOPLE ANNOYED YOU BY CRITICIZING YOUR DRINKING: NO
EVER HAD A DRINK FIRST THING IN THE MORNING TO STEADY YOUR NERVES TO GET RID OF A HANGOVER: NO
TOTAL SCORE: 0
HAVE YOU EVER FELT YOU SHOULD CUT DOWN ON YOUR DRINKING: NO

## 2024-10-25 ASSESSMENT — PAIN - FUNCTIONAL ASSESSMENT: PAIN_FUNCTIONAL_ASSESSMENT: 0-10

## 2024-10-25 ASSESSMENT — PAIN SCALES - GENERAL
PAINLEVEL_OUTOF10: 0 - NO PAIN
PAINLEVEL_OUTOF10: 0 - NO PAIN

## 2024-10-25 NOTE — ED PROVIDER NOTES
HPI   Chief Complaint   Patient presents with    Drug Overdose       Patient is a 49-year-old female presents emergency department for evaluation of accidental ingestion.  Patient states that she takes tizanidine daily.  She states that today she was refilling her medication dispenser and had 5 of her tizanidine tablets in her hand.  She states that she was not pain attention what she was doing and without thinking put her daily antibiotics that she is also taken to the same hand and took all the pills at once.  She realized that she took 4 extra of the tizanidine instead of putting them into her medication been.  She states this was not an attempt to hurt herself and she has no thoughts of wanting to hurt herself.  She states she is very scared about taking too much of her tizanidine.  She states that typically with 1 tizanidine she feels very drowsy and therefore scared about taking 5.  She states that other than feeling very nervous she just feels mildly nauseous.  She denies any abdominal pain, lightheadedness, dizziness.      History provided by:  Patient   used: No            Patient History   Past Medical History:   Diagnosis Date    Acute hypotension 2024    Acute non-ST elevation myocardial infarction (NSTEMI) (Multi) 2024    Calculus of kidney 2024    Fracture of tooth 2024    Infected sebaceous cyst 2024    Lactic acidosis 2024    Personal history of other complications of pregnancy, childbirth and the puerperium     History of spontaneous     Personal history of urinary calculi 2016    History of renal calculi    Toxic encephalopathy 2024     Past Surgical History:   Procedure Laterality Date    LITHOTRIPSY  2016    Renal Lithotripsy     Family History   Problem Relation Name Age of Onset    Other (bypass) Father      Heart disease Brother      Breast cancer Daughter          dion    Breast cancer Daughter       Social  History     Tobacco Use    Smoking status: Some Days     Current packs/day: 0.50     Types: Cigarettes    Smokeless tobacco: Never   Substance Use Topics    Alcohol use: Never    Drug use: Never       Physical Exam   ED Triage Vitals [10/25/24 0828]   Temperature Heart Rate Resp BP   36.6 °C (97.9 °F) (!) 104 -- 105/66      Pulse Ox Temp src Heart Rate Source Patient Position   97 % -- -- --      BP Location FiO2 (%)     -- --       Physical Exam  Constitutional:       Appearance: Normal appearance.   Cardiovascular:      Rate and Rhythm: Normal rate and regular rhythm.   Pulmonary:      Effort: Pulmonary effort is normal.      Breath sounds: Normal breath sounds.   Musculoskeletal:         General: Normal range of motion.   Skin:     General: Skin is warm and dry.   Neurological:      General: No focal deficit present.      Mental Status: She is alert and oriented to person, place, and time.   Psychiatric:      Comments: Tearful and anxious appearing.           ED Course & MDM   ED Course as of 10/25/24 1512   Fri Oct 25, 2024   0855 Spoke with poison control. Monitor pt for 4-6 hours, Typically peaks in 1-1 1/2 hour. Look for low blood pressure/low heart rate/drowsiness. [AF]   1128 BP: 105/66 [NG]      ED Course User Index  [AF] Kiah Fierro PA-C  [NG] Makayla Terry MD         Diagnoses as of 10/25/24 1512   Accidental overdose, initial encounter                 No data recorded     [place] Coma Scale Score: 15 (10/25/24 0827 : Mariangel Kelly LPN)                           Medical Decision Making  Patient is a 49-year-old female presents emergency department for evaluation of accidental ingestion.    EKG was interpreted by attending physician and reviewed by me.    Lab work done today included CMP, CBC, acute toxicology panel, urinalysis, urine drug screen, urine pregnancy.  Lab work with urine drug screen positive for oxycodone, hyperglycemia, mild electrolyte disturbances, and transaminitis with borderline  leukocytosis.    Scans not warranted at today's visit.    Medications given at today's visit include IV fluids    I saw this patient in conjunction with Dr. Terry.  Patient mildly hypotensive and feeling drowsy after ingestion.  Patient given supportive management of IV fluid hydration and maintained stable blood pressures.  Poison control was consulted and recommended period of observation of 6 hours and to watch for bradycardia, hypotension, and drowsiness, but do not recommend any specific interventions at this time.  Patient was observed for 6-hour period and feeling much improved at the end of 6 hours.  She has stable vital signs and blood pressure improved with fluid hydration and time.  Does not have any thoughts of hurting herself or others and reiterates this was an accidental ingestion.  Patient otherwise stable for discharge for close follow-up outpatient with primary care provider.  Patient agreeable to plan of discharge at this time.  Emergent pathologies were considered for this patient, although I have low suspicion for anything acutely emergent given patient's clinical presentation, history, physical exam, stable vital signs, and relatively unremarkable workup.  Discharging patient home is reasonable plan of care for outpatient management.    All labs, imaging, and diagnostic studies were reviewed by me and patient was counseled on clinical impression, expectations, and plan.  Patient was educated to follow-up with PCP in the following 1-2 days.  All questions from patient were answered. They elicited understanding and were agreeable to course of treatment.  Patient was discharged in stable condition and given strict return precautions.    ** Disclaimer:  Parts of this document were written utilizing a voice to text dictation software.  Note may contain minor transcription or typographical errors that were inadvertently transcribed by the computer software.        Procedure  Procedures     Kiah  IRASEMA Fierro  10/25/24 0007

## 2024-10-25 NOTE — PROGRESS NOTES
Attestation/Supervisory note for CHIDI Fierro        The patient is a 49-year-old female presenting to the emergency department for evaluation of an accidental ingestion of 4 extra tablets of her prescribed tizanidine, 4 mg tablets, this morning about 30 to 45 minutes prior to arrival.  She states that she was counting out her medications and she accidentally took 4 extra tablets.  She states that she is very anxious about it but does not have any other symptoms.  She denies any headache or visual changes.  No chest pain or shortness of breath.  No abdominal pain.  No nausea vomiting.  No diarrhea or constipation. no urinary complaints.  She denies any intent to hurt himself or anyone else.  No hallucinations.  She denies any previous suicidal attempts but states that she had a nervous breakdown many years ago.  She states that she is very concerned because typically when she takes one of the tizanidine tablets she will fall asleep from it.  She is concerned that taking 4 of them may be very dangerous.  All pertinent positives and negatives are recorded above.  All other systems reviewed and otherwise negative.  Vital signs with  borderline tachycardia but otherwise within normal limits.  Physical exam with a well-nourished well-developed female who appears anxious but has no other evidence of acute distress.  HEENT exam within normal limits.  She has no evidence of airway compromise or respiratory distress.  Abdominal exam is benign.  She does not have any gross motor, neurologic or vascular episodes on exam.        Poison control was contacted and recommended observation for 6 hours in the emergency room.  They report the peak onset of action will be within 1 hour to 1.5 hours.        EKG with normal sinus rhythm at 99 bpm, normal axis, normal voltage, normal ST segment, normal T waves        Diagnostic labs with a urine tox screen positive for oxycodone, proteinuria, mild electrolyte imbalance, mild hyperglycemia,  mild transaminitis, and mild leukocytosis but otherwise unremarkable.        The patient was observed in the emergency room for 6 hours with no obvious signs or symptoms of toxicity.  She was resting comfortably and remained alert and coherent during her time in the emergency department.       Patient was released in good condition.  She will follow-up with her primary care physician within 1 to 2 days for further management of her current symptoms.  She will return to the emergency department sooner with worsening of symptoms or onset of new symptoms.     Impression/diagnosis:  1.  Accidental ingestion of prescribed medication  2.  Electrolyte imbalance  3.  Leukocytosis, unspecified  4.  Transaminitis        I personally saw the patient and made/approve the management plan and take responsibility for the patient management.        I independently interpreted the following study (S) EKG and diagnostic labs        I personally discussed the patient's management with the patient        Makayla Terry MD

## 2024-10-25 NOTE — ED TRIAGE NOTES
Pt accidentally took 4 more of her zanaflex when taking her morning medication. Pt denies trying to hurt herself.

## 2024-11-07 ENCOUNTER — PATIENT OUTREACH (OUTPATIENT)
Dept: PRIMARY CARE | Facility: CLINIC | Age: 49
End: 2024-11-07
Payer: COMMERCIAL

## 2024-11-07 NOTE — PROGRESS NOTES
Patient has met target of no readmission for 90 days post hospital discharge and is graduated from Transitional Care Management program at this time.

## 2024-12-24 DIAGNOSIS — F39 MOOD DISORDER (CMS-HCC): ICD-10-CM

## 2024-12-24 RX ORDER — BUSPIRONE HYDROCHLORIDE 10 MG/1
20 TABLET ORAL 2 TIMES DAILY
Qty: 360 TABLET | Refills: 1 | Status: SHIPPED | OUTPATIENT
Start: 2024-12-24

## 2025-01-04 ENCOUNTER — HOSPITAL ENCOUNTER (EMERGENCY)
Facility: HOSPITAL | Age: 50
Discharge: HOME | End: 2025-01-04
Attending: EMERGENCY MEDICINE
Payer: COMMERCIAL

## 2025-01-04 ENCOUNTER — APPOINTMENT (OUTPATIENT)
Dept: URGENT CARE | Age: 50
End: 2025-01-04

## 2025-01-04 ENCOUNTER — APPOINTMENT (OUTPATIENT)
Dept: RADIOLOGY | Facility: HOSPITAL | Age: 50
End: 2025-01-04
Payer: COMMERCIAL

## 2025-01-04 VITALS
HEIGHT: 72 IN | OXYGEN SATURATION: 94 % | DIASTOLIC BLOOD PRESSURE: 100 MMHG | WEIGHT: 293 LBS | TEMPERATURE: 98.1 F | SYSTOLIC BLOOD PRESSURE: 152 MMHG | HEART RATE: 100 BPM | BODY MASS INDEX: 39.68 KG/M2 | RESPIRATION RATE: 18 BRPM

## 2025-01-04 DIAGNOSIS — Z72.0 TOBACCO ABUSE: ICD-10-CM

## 2025-01-04 DIAGNOSIS — J20.9 ACUTE BRONCHITIS, UNSPECIFIED ORGANISM: Primary | ICD-10-CM

## 2025-01-04 LAB
ANION GAP SERPL CALCULATED.3IONS-SCNC: 14 MMOL/L (ref 10–20)
BASOPHILS # BLD AUTO: 0.08 X10*3/UL (ref 0–0.1)
BASOPHILS NFR BLD AUTO: 0.8 %
BUN SERPL-MCNC: 29 MG/DL (ref 6–23)
CALCIUM SERPL-MCNC: 10 MG/DL (ref 8.6–10.3)
CHLORIDE SERPL-SCNC: 105 MMOL/L (ref 98–107)
CO2 SERPL-SCNC: 24 MMOL/L (ref 21–32)
CREAT SERPL-MCNC: 1.61 MG/DL (ref 0.5–1.05)
EGFRCR SERPLBLD CKD-EPI 2021: 39 ML/MIN/1.73M*2
EOSINOPHIL # BLD AUTO: 0.65 X10*3/UL (ref 0–0.7)
EOSINOPHIL NFR BLD AUTO: 6.4 %
ERYTHROCYTE [DISTWIDTH] IN BLOOD BY AUTOMATED COUNT: 12.8 % (ref 11.5–14.5)
FLUAV RNA RESP QL NAA+PROBE: NOT DETECTED
FLUBV RNA RESP QL NAA+PROBE: NOT DETECTED
GLUCOSE SERPL-MCNC: 148 MG/DL (ref 74–99)
HCT VFR BLD AUTO: 43.3 % (ref 36–46)
HGB BLD-MCNC: 14.5 G/DL (ref 12–16)
IMM GRANULOCYTES # BLD AUTO: 0.24 X10*3/UL (ref 0–0.7)
IMM GRANULOCYTES NFR BLD AUTO: 2.4 % (ref 0–0.9)
LYMPHOCYTES # BLD AUTO: 1.72 X10*3/UL (ref 1.2–4.8)
LYMPHOCYTES NFR BLD AUTO: 17 %
MCH RBC QN AUTO: 34.7 PG (ref 26–34)
MCHC RBC AUTO-ENTMCNC: 33.5 G/DL (ref 32–36)
MCV RBC AUTO: 104 FL (ref 80–100)
MONOCYTES # BLD AUTO: 1.03 X10*3/UL (ref 0.1–1)
MONOCYTES NFR BLD AUTO: 10.2 %
NEUTROPHILS # BLD AUTO: 6.39 X10*3/UL (ref 1.2–7.7)
NEUTROPHILS NFR BLD AUTO: 63.2 %
NRBC BLD-RTO: 0 /100 WBCS (ref 0–0)
PLATELET # BLD AUTO: 332 X10*3/UL (ref 150–450)
POTASSIUM SERPL-SCNC: 4.3 MMOL/L (ref 3.5–5.3)
RBC # BLD AUTO: 4.18 X10*6/UL (ref 4–5.2)
RSV RNA RESP QL NAA+PROBE: NOT DETECTED
SARS-COV-2 RNA RESP QL NAA+PROBE: NOT DETECTED
SODIUM SERPL-SCNC: 139 MMOL/L (ref 136–145)
WBC # BLD AUTO: 10.1 X10*3/UL (ref 4.4–11.3)

## 2025-01-04 PROCEDURE — 96366 THER/PROPH/DIAG IV INF ADDON: CPT

## 2025-01-04 PROCEDURE — 85025 COMPLETE CBC W/AUTO DIFF WBC: CPT | Performed by: PHYSICIAN ASSISTANT

## 2025-01-04 PROCEDURE — 2500000002 HC RX 250 W HCPCS SELF ADMINISTERED DRUGS (ALT 637 FOR MEDICARE OP, ALT 636 FOR OP/ED): Performed by: PHYSICIAN ASSISTANT

## 2025-01-04 PROCEDURE — 99285 EMERGENCY DEPT VISIT HI MDM: CPT | Mod: 25 | Performed by: EMERGENCY MEDICINE

## 2025-01-04 PROCEDURE — 2500000002 HC RX 250 W HCPCS SELF ADMINISTERED DRUGS (ALT 637 FOR MEDICARE OP, ALT 636 FOR OP/ED): Performed by: EMERGENCY MEDICINE

## 2025-01-04 PROCEDURE — 71046 X-RAY EXAM CHEST 2 VIEWS: CPT

## 2025-01-04 PROCEDURE — 96365 THER/PROPH/DIAG IV INF INIT: CPT

## 2025-01-04 PROCEDURE — 82374 ASSAY BLOOD CARBON DIOXIDE: CPT | Performed by: PHYSICIAN ASSISTANT

## 2025-01-04 PROCEDURE — 2500000004 HC RX 250 GENERAL PHARMACY W/ HCPCS (ALT 636 FOR OP/ED): Performed by: EMERGENCY MEDICINE

## 2025-01-04 PROCEDURE — 71046 X-RAY EXAM CHEST 2 VIEWS: CPT | Mod: FOREIGN READ | Performed by: RADIOLOGY

## 2025-01-04 PROCEDURE — 87634 RSV DNA/RNA AMP PROBE: CPT | Performed by: PHYSICIAN ASSISTANT

## 2025-01-04 PROCEDURE — 2500000004 HC RX 250 GENERAL PHARMACY W/ HCPCS (ALT 636 FOR OP/ED): Performed by: PHYSICIAN ASSISTANT

## 2025-01-04 PROCEDURE — 94640 AIRWAY INHALATION TREATMENT: CPT

## 2025-01-04 PROCEDURE — 36415 COLL VENOUS BLD VENIPUNCTURE: CPT | Performed by: PHYSICIAN ASSISTANT

## 2025-01-04 RX ORDER — PREDNISONE 20 MG/1
40 TABLET ORAL ONCE
Status: COMPLETED | OUTPATIENT
Start: 2025-01-04 | End: 2025-01-04

## 2025-01-04 RX ORDER — ALBUTEROL SULFATE 0.83 MG/ML
2.5 SOLUTION RESPIRATORY (INHALATION) ONCE
Status: COMPLETED | OUTPATIENT
Start: 2025-01-04 | End: 2025-01-04

## 2025-01-04 RX ORDER — MAGNESIUM SULFATE HEPTAHYDRATE 40 MG/ML
2 INJECTION, SOLUTION INTRAVENOUS ONCE
Status: COMPLETED | OUTPATIENT
Start: 2025-01-04 | End: 2025-01-04

## 2025-01-04 RX ORDER — PREDNISONE 50 MG/1
50 TABLET ORAL DAILY
Qty: 4 TABLET | Refills: 0 | Status: SHIPPED | OUTPATIENT
Start: 2025-01-05 | End: 2025-01-09

## 2025-01-04 RX ORDER — ALBUTEROL SULFATE 0.83 MG/ML
2.5 SOLUTION RESPIRATORY (INHALATION) EVERY 6 HOURS PRN
Qty: 300 ML | Refills: 0 | Status: SHIPPED | OUTPATIENT
Start: 2025-01-04 | End: 2025-02-03

## 2025-01-04 RX ORDER — IPRATROPIUM BROMIDE AND ALBUTEROL SULFATE 2.5; .5 MG/3ML; MG/3ML
3 SOLUTION RESPIRATORY (INHALATION) ONCE
Status: COMPLETED | OUTPATIENT
Start: 2025-01-04 | End: 2025-01-04

## 2025-01-04 RX ADMIN — IPRATROPIUM BROMIDE AND ALBUTEROL SULFATE 3 ML: 2.5; .5 SOLUTION RESPIRATORY (INHALATION) at 09:05

## 2025-01-04 RX ADMIN — MAGNESIUM SULFATE HEPTAHYDRATE 2 G: 40 INJECTION, SOLUTION INTRAVENOUS at 10:16

## 2025-01-04 RX ADMIN — PREDNISONE 40 MG: 20 TABLET ORAL at 09:05

## 2025-01-04 RX ADMIN — ALBUTEROL SULFATE 2.5 MG: 2.5 SOLUTION RESPIRATORY (INHALATION) at 10:15

## 2025-01-04 ASSESSMENT — LIFESTYLE VARIABLES
EVER FELT BAD OR GUILTY ABOUT YOUR DRINKING: NO
EVER HAD A DRINK FIRST THING IN THE MORNING TO STEADY YOUR NERVES TO GET RID OF A HANGOVER: NO
HAVE YOU EVER FELT YOU SHOULD CUT DOWN ON YOUR DRINKING: NO
HAVE PEOPLE ANNOYED YOU BY CRITICIZING YOUR DRINKING: NO
TOTAL SCORE: 0

## 2025-01-04 ASSESSMENT — PAIN - FUNCTIONAL ASSESSMENT: PAIN_FUNCTIONAL_ASSESSMENT: 0-10

## 2025-01-04 ASSESSMENT — PAIN SCALES - GENERAL
PAINLEVEL_OUTOF10: 0 - NO PAIN
PAINLEVEL_OUTOF10: 0 - NO PAIN

## 2025-01-04 NOTE — ED PROVIDER NOTES
IN BRIEF    History: 49-year-old female with no prior past medical history of lung disorder, current smoker, presents with cough and congestion as well as shortness of breath.  No fever or chills.  She states it feels tight when she is breathing.    Exam: Patient sitting comfortably in no acute respiratory distress, she is diminished in all lung fields with inspiratory and expiratory wheezing throughout       ED COURSE   MDM: Workup returned demonstrating a slight elevation in her creatinine that is comparable to prior.  There is no leukocytosis.  Flu, COVID, RSV are negative and chest x-ray shows no acute infiltrate.  On reexamination after 2 DuoNeb treatments the patient is still very tight and wheezy.  She is given a dose of prednisone.  Another albuterol will be provided in addition to a dose of magnesium.  Will plan on discharge with nebulizer treatments that she has a machine at home as well as continued prednisone.  Importance of smoking cessation discussed.    I personally saw the patient and made/approved the management plan and take responsibility for the patient management.  Parts of this chart were completed with dictation software, please excuse any errors in transcription.     Leanne Prasad DO  10:34 AM

## 2025-01-04 NOTE — ED PROVIDER NOTES
HPI   Chief Complaint   Patient presents with    Cough       HPI  This is a 49-year-old female presenting to the emerged part with complaints of productive cough, chest congestion nasal congestion over the last 4 days.  Patient does not have a history of COPD or asthma, she is a daily cigarette smoker.  Patient states that she has tightness in her chest and feels like it is difficulty to full breath then.  No recent travel anywhere.  No subjective fever, chills or night sweats.  No abdominal pain, vomiting or diarrhea.    Please see HPI for pertinent positive and negative ROS.       Patient History   Past Medical History:   Diagnosis Date    Acute hypotension 2024    Acute non-ST elevation myocardial infarction (NSTEMI) (Multi) 2024    Calculus of kidney 2024    Fracture of tooth 2024    Infected sebaceous cyst 2024    Lactic acidosis 2024    Personal history of other complications of pregnancy, childbirth and the puerperium     History of spontaneous     Personal history of urinary calculi 2016    History of renal calculi    Toxic encephalopathy 2024     Past Surgical History:   Procedure Laterality Date    LITHOTRIPSY  2016    Renal Lithotripsy     Family History   Problem Relation Name Age of Onset    Other (bypass) Father      Heart disease Brother      Breast cancer Daughter          dion    Breast cancer Daughter       Social History     Tobacco Use    Smoking status: Some Days     Current packs/day: 0.50     Types: Cigarettes    Smokeless tobacco: Never   Substance Use Topics    Alcohol use: Never    Drug use: Never       Physical Exam   ED Triage Vitals [25 0829]   Temperature Heart Rate Respirations BP   36.7 °C (98.1 °F) 100 18 (!) 152/100      Pulse Ox Temp Source Heart Rate Source Patient Position   94 % Tympanic Monitor Sitting      BP Location FiO2 (%)     Left arm --       Physical Exam  GENERAL APPEARANCE: Awake and alert. No acute  respiratory distress.   VITAL SIGNS: As per the nurses' triage record.  HEENT: Normocephalic, atraumatic.   NECK: Soft, nontender and supple  CHEST: Nontender to palpation. Diffuse diminished breath sounds bilaterally.  Symmetric rise and fall of chest wall.   HEART: Clear S1 and S2. Regular rate and rhythm. Strong and equal pulses in the extremities.  ABDOMEN: Soft, nontender, nondistended  MUSCULOSKELETAL: Full gross active range of motion. Ambulating on own with no acute difficulties  NEUROLOGICAL: Awake, alert and oriented x 3. Motor power intact in the upper and lower extremities. Sensation is intact to light touch in the upper and lower extremities. Patient answering questions appropriately.   IMMUNOLOGICAL: No lymphatic streaking noted  DERMATOLOGIC: Warm and dry without petechiae, rashes, or ecchymosis noted on visible skin.   PYSCH: Cooperative with appropriate mood and affect.    ED Course & MDM   Diagnoses as of 01/04/25 1301   Acute bronchitis, unspecified organism   Tobacco abuse                 No data recorded     [place] Coma Scale Score: 15 (01/04/25 0855 : Rubina Montejo RN)                           Medical Decision Making  Parts of this chart have been completed using voice recognition software. Please excuse any errors of transcription.  My thought process and reason for plan has been formulated from the time that I saw the patient until the time of disposition and is not specific to one specific moment during their visit and furthermore my MDM encompasses this entire chart and not only this text box.      HPI: Detailed above.    Exam: A medically appropriate exam performed, outlined above, given the known history and presentation.    History obtained from: Patient    Medications given during visit:  Medications   ipratropium-albuteroL (Duo-Neb) 0.5-2.5 mg/3 mL nebulizer solution 3 mL (3 mL nebulization Given 1/4/25 0905)   ipratropium-albuteroL (Duo-Neb) 0.5-2.5 mg/3 mL nebulizer solution 3  mL (3 mL nebulization Given 1/4/25 0905)   predniSONE (Deltasone) tablet 40 mg (40 mg oral Given 1/4/25 0905)   albuterol 2.5 mg /3 mL (0.083 %) nebulizer solution 2.5 mg (2.5 mg nebulization Given 1/4/25 1015)   magnesium sulfate 2 g in sterile water for injection 50 mL (0 g intravenous Stopped 1/4/25 1142)        Diagnostic/tests  Labs Reviewed   CBC WITH AUTO DIFFERENTIAL - Abnormal       Result Value    WBC 10.1      nRBC 0.0      RBC 4.18      Hemoglobin 14.5      Hematocrit 43.3       (*)     MCH 34.7 (*)     MCHC 33.5      RDW 12.8      Platelets 332      Neutrophils % 63.2      Immature Granulocytes %, Automated 2.4 (*)     Lymphocytes % 17.0      Monocytes % 10.2      Eosinophils % 6.4      Basophils % 0.8      Neutrophils Absolute 6.39      Immature Granulocytes Absolute, Automated 0.24      Lymphocytes Absolute 1.72      Monocytes Absolute 1.03 (*)     Eosinophils Absolute 0.65      Basophils Absolute 0.08     BASIC METABOLIC PANEL - Abnormal    Glucose 148 (*)     Sodium 139      Potassium 4.3      Chloride 105      Bicarbonate 24      Anion Gap 14      Urea Nitrogen 29 (*)     Creatinine 1.61 (*)     eGFR 39 (*)     Calcium 10.0     SARS-COV-2 AND INFLUENZA A/B PCR - Normal    Flu A Result Not Detected      Flu B Result Not Detected      Coronavirus 2019, PCR Not Detected      Narrative:     This assay has received FDA Emergency Use Authorization (EUA) and  is only authorized for the duration of time that circumstances exist to justify the authorization of the emergency use of in vitro diagnostic tests for the detection of SARS-CoV-2 virus and/or diagnosis of COVID-19 infection under section 564(b)(1) of the Act, 21 U.S.C. 360bbb-3(b)(1). Testing for SARS-CoV-2 is only recommended for patients who meet current clinical and/or epidemiological criteria as defined by federal, state, or local public health directives. This assay is an in vitro diagnostic nucleic acid amplification test for the  qualitative detection of SARS-CoV-2, Influenza A, and Influenza B from nasopharyngeal specimens and has been validated for use at [facility]. Negative results do not preclude COVID-19 infections or Influenza A/B infections, and should not be used as the sole basis for diagnosis, treatment, or other management decisions. If Influenza A/B and RSV PCR results are negative, testing for Parainfluenza virus, Adenovirus and Metapneumovirus is routinely performed for [facility] [place] in[place], and is available on other patients by placing an add-on request.    RSV PCR - Normal    RSV PCR Not Detected      Narrative:     This assay is an FDA-cleared, in vitro diagnostic nucleic acid amplification test for the detection of RSV from nasopharyngeal specimens, and has been validated for use at [facility]. Negative results do not preclude RSV infections, and should not be used as the sole basis for diagnosis, treatment, or other management decisions. If Influenza A/B and RSV PCR results are negative, testing for Parainfluenza virus, Adenovirus and Metapneumovirus is routinely performed for pediatric oncology and intensive care inpatients at [facility], and is available on other patients by placing an add-on request.          XR chest 2 views   Final Result   No acute infiltrate or effusion.   Signed by Krishan Hoyt MD           Considerations/further MDM:  Patient was seen in conjucntion with my supervising physician,  Dr. Prasad. Please refer to her note.    Differential diagnosis includes was not limited to pneumonia versus acute bronchitis versus reactive airway disease     Chest x-ray shows no acute infiltrate or effusion.  Negative viral swabs.  CBC shows no leukocytosis.  BMP shows stable renal function for patient.  She was given oral prednisone, 2 DuoNebs, 1 albuterol nebulizer and 2 g of IV magnesium.  Auscultation of lungs after treatment revealed  diffuse inspiratory wheezing but better air movement in the lungs.  Patient states that her symptoms were much improved, tightness was improved and coughing was reduced.  Patient was discharged with a prescription for prednisone and albuterol vials to use with her nebulizer machine at home.  She was educated that she should try to stop smoking as this is worsening the suspected viral respiratory tract infection.  Patient verbalized understanding.  She is given strict return precautions.  She was discharged in stable condition.    Procedure  Procedures     Aurelia Langley PA-C  01/04/25 5647

## 2025-01-04 NOTE — DISCHARGE INSTRUCTIONS
Use the nebulizer treatments every 4-6 hours for the next 48  hours, afterward may use as needed. Take the steroids daily starting tomorrow (first dose was giving in the ED today). Follow up with your primary care physician. If you have a change or worsening symptoms seek care immediately.

## 2025-01-06 ENCOUNTER — TELEPHONE (OUTPATIENT)
Dept: PRIMARY CARE | Facility: CLINIC | Age: 50
End: 2025-01-06
Payer: COMMERCIAL

## 2025-01-06 DIAGNOSIS — R06.02 SHORTNESS OF BREATH: Primary | ICD-10-CM

## 2025-01-06 NOTE — TELEPHONE ENCOUNTER
Patient called to let you know she was in the ER this past weekend for SOB, cough - she was told she had bronchitis. They gave her prednisone and albuterol breathing treatments

## 2025-01-06 NOTE — TELEPHONE ENCOUNTER
Noted. She should probably have updated lung function testing so we can see what her lung capacity is and get her on medications to prevent these kinds of flare ups in the future. Order placed for PFT, please encourage her to schedule this.

## 2025-01-23 DIAGNOSIS — F41.9 ANXIETY: ICD-10-CM

## 2025-01-24 RX ORDER — GABAPENTIN 800 MG/1
TABLET ORAL
Qty: 120 TABLET | Refills: 5 | Status: SHIPPED | OUTPATIENT
Start: 2025-01-24

## 2025-02-12 ENCOUNTER — HOSPITAL ENCOUNTER (OUTPATIENT)
Dept: RESPIRATORY THERAPY | Facility: HOSPITAL | Age: 50
Setting detail: THERAPIES SERIES
Discharge: HOME | End: 2025-02-12
Payer: COMMERCIAL

## 2025-02-12 DIAGNOSIS — R06.02 SHORTNESS OF BREATH: ICD-10-CM

## 2025-02-12 PROCEDURE — 94060 EVALUATION OF WHEEZING: CPT

## 2025-02-12 PROCEDURE — 2500000002 HC RX 250 W HCPCS SELF ADMINISTERED DRUGS (ALT 637 FOR MEDICARE OP, ALT 636 FOR OP/ED)

## 2025-02-12 RX ORDER — ALBUTEROL SULFATE 0.83 MG/ML
SOLUTION RESPIRATORY (INHALATION)
Status: COMPLETED
Start: 2025-02-12 | End: 2025-02-12

## 2025-02-12 RX ADMIN — ALBUTEROL SULFATE: 2.5 SOLUTION RESPIRATORY (INHALATION) at 08:44

## 2025-02-13 DIAGNOSIS — R00.0 TACHYCARDIA, UNSPECIFIED: ICD-10-CM

## 2025-02-13 RX ORDER — METOPROLOL SUCCINATE 100 MG/1
TABLET, EXTENDED RELEASE ORAL
Qty: 135 TABLET | Refills: 1 | Status: SHIPPED | OUTPATIENT
Start: 2025-02-13

## 2025-02-22 DIAGNOSIS — E78.5 HYPERLIPIDEMIA, UNSPECIFIED: ICD-10-CM

## 2025-02-24 DIAGNOSIS — J41.0 SIMPLE CHRONIC BRONCHITIS (MULTI): Primary | ICD-10-CM

## 2025-02-24 RX ORDER — ATORVASTATIN CALCIUM 80 MG/1
80 TABLET, FILM COATED ORAL DAILY
Qty: 90 TABLET | Refills: 3 | Status: SHIPPED | OUTPATIENT
Start: 2025-02-24

## 2025-02-24 RX ORDER — TIOTROPIUM BROMIDE INHALATION SPRAY 1.56 UG/1
2 SPRAY, METERED RESPIRATORY (INHALATION) DAILY
Qty: 8 G | Refills: 11 | Status: SHIPPED | OUTPATIENT
Start: 2025-02-24 | End: 2026-02-24

## 2025-02-24 NOTE — TELEPHONE ENCOUNTER
Please let pt know that lung function testing did show evidence of mild COPD which can cause chronic shortness of breath. Would recommend a daily inhaler like trelegy to help with shortness of breath and prevent worsening of sxs. If agreeable, will send rx to pharmacy.

## 2025-03-10 ENCOUNTER — HOSPITAL ENCOUNTER (EMERGENCY)
Facility: HOSPITAL | Age: 50
Discharge: HOME | End: 2025-03-11
Attending: EMERGENCY MEDICINE
Payer: COMMERCIAL

## 2025-03-10 ENCOUNTER — APPOINTMENT (OUTPATIENT)
Dept: RADIOLOGY | Facility: HOSPITAL | Age: 50
End: 2025-03-10
Payer: COMMERCIAL

## 2025-03-10 ENCOUNTER — APPOINTMENT (OUTPATIENT)
Dept: CARDIOLOGY | Facility: HOSPITAL | Age: 50
End: 2025-03-10
Payer: COMMERCIAL

## 2025-03-10 DIAGNOSIS — R10.13 EPIGASTRIC PAIN: ICD-10-CM

## 2025-03-10 DIAGNOSIS — R07.9 ACUTE CHEST PAIN: Primary | ICD-10-CM

## 2025-03-10 LAB
ALBUMIN SERPL BCP-MCNC: 4.1 G/DL (ref 3.4–5)
ALP SERPL-CCNC: 86 U/L (ref 33–110)
ALT SERPL W P-5'-P-CCNC: 46 U/L (ref 7–45)
ANION GAP SERPL CALCULATED.3IONS-SCNC: 18 MMOL/L (ref 10–20)
AST SERPL W P-5'-P-CCNC: 46 U/L (ref 9–39)
BASOPHILS # BLD AUTO: 0.08 X10*3/UL (ref 0–0.1)
BASOPHILS NFR BLD AUTO: 0.6 %
BILIRUB SERPL-MCNC: 0.4 MG/DL (ref 0–1.2)
BNP SERPL-MCNC: 76 PG/ML (ref 0–99)
BUN SERPL-MCNC: 33 MG/DL (ref 6–23)
CALCIUM SERPL-MCNC: 10.7 MG/DL (ref 8.6–10.3)
CARDIAC TROPONIN I PNL SERPL HS: 7 NG/L (ref 0–13)
CARDIAC TROPONIN I PNL SERPL HS: 7 NG/L (ref 0–13)
CHLORIDE SERPL-SCNC: 100 MMOL/L (ref 98–107)
CO2 SERPL-SCNC: 23 MMOL/L (ref 21–32)
CREAT SERPL-MCNC: 1.26 MG/DL (ref 0.5–1.05)
EGFRCR SERPLBLD CKD-EPI 2021: 52 ML/MIN/1.73M*2
EOSINOPHIL # BLD AUTO: 0.52 X10*3/UL (ref 0–0.7)
EOSINOPHIL NFR BLD AUTO: 4.1 %
ERYTHROCYTE [DISTWIDTH] IN BLOOD BY AUTOMATED COUNT: 12.6 % (ref 11.5–14.5)
GLUCOSE SERPL-MCNC: 169 MG/DL (ref 74–99)
HCT VFR BLD AUTO: 40.2 % (ref 36–46)
HGB BLD-MCNC: 13.9 G/DL (ref 12–16)
IMM GRANULOCYTES # BLD AUTO: 0.16 X10*3/UL (ref 0–0.7)
IMM GRANULOCYTES NFR BLD AUTO: 1.3 % (ref 0–0.9)
LIPASE SERPL-CCNC: 42 U/L (ref 9–82)
LYMPHOCYTES # BLD AUTO: 2.94 X10*3/UL (ref 1.2–4.8)
LYMPHOCYTES NFR BLD AUTO: 23.2 %
MCH RBC QN AUTO: 34.8 PG (ref 26–34)
MCHC RBC AUTO-ENTMCNC: 34.6 G/DL (ref 32–36)
MCV RBC AUTO: 101 FL (ref 80–100)
MONOCYTES # BLD AUTO: 0.94 X10*3/UL (ref 0.1–1)
MONOCYTES NFR BLD AUTO: 7.4 %
NEUTROPHILS # BLD AUTO: 8.04 X10*3/UL (ref 1.2–7.7)
NEUTROPHILS NFR BLD AUTO: 63.4 %
NRBC BLD-RTO: 0 /100 WBCS (ref 0–0)
PLATELET # BLD AUTO: 320 X10*3/UL (ref 150–450)
POTASSIUM SERPL-SCNC: 4.7 MMOL/L (ref 3.5–5.3)
PROT SERPL-MCNC: 7.2 G/DL (ref 6.4–8.2)
RBC # BLD AUTO: 4 X10*6/UL (ref 4–5.2)
SODIUM SERPL-SCNC: 136 MMOL/L (ref 136–145)
WBC # BLD AUTO: 12.7 X10*3/UL (ref 4.4–11.3)

## 2025-03-10 PROCEDURE — 36415 COLL VENOUS BLD VENIPUNCTURE: CPT | Performed by: STUDENT IN AN ORGANIZED HEALTH CARE EDUCATION/TRAINING PROGRAM

## 2025-03-10 PROCEDURE — 96375 TX/PRO/DX INJ NEW DRUG ADDON: CPT | Mod: 59

## 2025-03-10 PROCEDURE — 2550000001 HC RX 255 CONTRASTS: Performed by: EMERGENCY MEDICINE

## 2025-03-10 PROCEDURE — 84484 ASSAY OF TROPONIN QUANT: CPT | Performed by: STUDENT IN AN ORGANIZED HEALTH CARE EDUCATION/TRAINING PROGRAM

## 2025-03-10 PROCEDURE — 83880 ASSAY OF NATRIURETIC PEPTIDE: CPT | Performed by: CLINICAL NURSE SPECIALIST

## 2025-03-10 PROCEDURE — 93005 ELECTROCARDIOGRAM TRACING: CPT

## 2025-03-10 PROCEDURE — 2500000004 HC RX 250 GENERAL PHARMACY W/ HCPCS (ALT 636 FOR OP/ED): Performed by: CLINICAL NURSE SPECIALIST

## 2025-03-10 PROCEDURE — 85025 COMPLETE CBC W/AUTO DIFF WBC: CPT | Performed by: STUDENT IN AN ORGANIZED HEALTH CARE EDUCATION/TRAINING PROGRAM

## 2025-03-10 PROCEDURE — 96374 THER/PROPH/DIAG INJ IV PUSH: CPT | Mod: 59

## 2025-03-10 PROCEDURE — 83690 ASSAY OF LIPASE: CPT | Performed by: CLINICAL NURSE SPECIALIST

## 2025-03-10 PROCEDURE — 71275 CT ANGIOGRAPHY CHEST: CPT

## 2025-03-10 PROCEDURE — 71046 X-RAY EXAM CHEST 2 VIEWS: CPT

## 2025-03-10 PROCEDURE — 71046 X-RAY EXAM CHEST 2 VIEWS: CPT | Mod: FOREIGN READ | Performed by: RADIOLOGY

## 2025-03-10 PROCEDURE — 80053 COMPREHEN METABOLIC PANEL: CPT | Performed by: STUDENT IN AN ORGANIZED HEALTH CARE EDUCATION/TRAINING PROGRAM

## 2025-03-10 PROCEDURE — 99285 EMERGENCY DEPT VISIT HI MDM: CPT | Mod: 25 | Performed by: EMERGENCY MEDICINE

## 2025-03-10 RX ORDER — FENTANYL CITRATE 50 UG/ML
25 INJECTION, SOLUTION INTRAMUSCULAR; INTRAVENOUS ONCE
Status: COMPLETED | OUTPATIENT
Start: 2025-03-10 | End: 2025-03-10

## 2025-03-10 RX ORDER — NAPROXEN SODIUM 220 MG/1
324 TABLET, FILM COATED ORAL ONCE
Status: DISCONTINUED | OUTPATIENT
Start: 2025-03-10 | End: 2025-03-11 | Stop reason: HOSPADM

## 2025-03-10 RX ORDER — FAMOTIDINE 10 MG/ML
40 INJECTION, SOLUTION INTRAVENOUS ONCE
Status: COMPLETED | OUTPATIENT
Start: 2025-03-10 | End: 2025-03-10

## 2025-03-10 RX ORDER — ONDANSETRON HYDROCHLORIDE 2 MG/ML
4 INJECTION, SOLUTION INTRAVENOUS ONCE
Status: COMPLETED | OUTPATIENT
Start: 2025-03-10 | End: 2025-03-10

## 2025-03-10 RX ADMIN — FAMOTIDINE 40 MG: 10 INJECTION, SOLUTION INTRAVENOUS at 22:16

## 2025-03-10 RX ADMIN — FENTANYL CITRATE 25 MCG: 0.05 INJECTION, SOLUTION INTRAMUSCULAR; INTRAVENOUS at 22:16

## 2025-03-10 RX ADMIN — ONDANSETRON 4 MG: 2 INJECTION, SOLUTION INTRAMUSCULAR; INTRAVENOUS at 22:15

## 2025-03-10 RX ADMIN — IOHEXOL 75 ML: 350 INJECTION, SOLUTION INTRAVENOUS at 23:53

## 2025-03-10 ASSESSMENT — PAIN DESCRIPTION - PAIN TYPE: TYPE: ACUTE PAIN

## 2025-03-10 ASSESSMENT — PAIN SCALES - GENERAL
PAINLEVEL_OUTOF10: 2
PAINLEVEL_OUTOF10: 2
PAINLEVEL_OUTOF10: 6

## 2025-03-10 ASSESSMENT — PAIN DESCRIPTION - FREQUENCY: FREQUENCY: CONSTANT/CONTINUOUS

## 2025-03-10 ASSESSMENT — PAIN DESCRIPTION - ONSET: ONSET: ONGOING

## 2025-03-10 ASSESSMENT — PAIN DESCRIPTION - LOCATION: LOCATION: CHEST

## 2025-03-10 ASSESSMENT — PAIN - FUNCTIONAL ASSESSMENT: PAIN_FUNCTIONAL_ASSESSMENT: 0-10

## 2025-03-10 ASSESSMENT — PAIN DESCRIPTION - PROGRESSION: CLINICAL_PROGRESSION: NOT CHANGED

## 2025-03-10 ASSESSMENT — PAIN DESCRIPTION - ORIENTATION: ORIENTATION: MID

## 2025-03-10 ASSESSMENT — PAIN DESCRIPTION - DESCRIPTORS: DESCRIPTORS: ACHING

## 2025-03-11 VITALS
OXYGEN SATURATION: 92 % | HEART RATE: 98 BPM | DIASTOLIC BLOOD PRESSURE: 78 MMHG | HEIGHT: 72 IN | RESPIRATION RATE: 15 BRPM | BODY MASS INDEX: 39.68 KG/M2 | WEIGHT: 293 LBS | SYSTOLIC BLOOD PRESSURE: 108 MMHG | TEMPERATURE: 98.4 F

## 2025-03-11 LAB
ATRIAL RATE: 113 BPM
P AXIS: 66 DEGREES
P OFFSET: 209 MS
P ONSET: 139 MS
PR INTERVAL: 170 MS
Q ONSET: 224 MS
QRS COUNT: 18 BEATS
QRS DURATION: 72 MS
QT INTERVAL: 338 MS
QTC CALCULATION(BAZETT): 463 MS
QTC FREDERICIA: 417 MS
R AXIS: 80 DEGREES
T AXIS: 72 DEGREES
T OFFSET: 393 MS
VENTRICULAR RATE: 113 BPM

## 2025-03-11 PROCEDURE — 2500000001 HC RX 250 WO HCPCS SELF ADMINISTERED DRUGS (ALT 637 FOR MEDICARE OP): Performed by: CLINICAL NURSE SPECIALIST

## 2025-03-11 RX ORDER — OXYCODONE AND ACETAMINOPHEN 5; 325 MG/1; MG/1
1 TABLET ORAL ONCE
Status: COMPLETED | OUTPATIENT
Start: 2025-03-11 | End: 2025-03-11

## 2025-03-11 RX ORDER — DICYCLOMINE HYDROCHLORIDE 20 MG/1
20 TABLET ORAL 2 TIMES DAILY
Qty: 20 TABLET | Refills: 0 | Status: SHIPPED | OUTPATIENT
Start: 2025-03-11 | End: 2025-03-21

## 2025-03-11 RX ADMIN — OXYCODONE HYDROCHLORIDE AND ACETAMINOPHEN 1 TABLET: 5; 325 TABLET ORAL at 00:14

## 2025-03-11 ASSESSMENT — HEART SCORE
RISK FACTORS: >2 RISK FACTORS OR HX OF ATHEROSCLEROTIC DISEASE
ECG: NON-SPECIFIC REPOLARIZATION DISTURBANCE
TROPONIN: LESS THAN OR EQUAL TO NORMAL LIMIT
HISTORY: SLIGHTLY SUSPICIOUS
AGE: 45-64
HEART SCORE: 4

## 2025-03-11 ASSESSMENT — PAIN SCALES - GENERAL: PAINLEVEL_OUTOF10: 8

## 2025-03-11 ASSESSMENT — PAIN DESCRIPTION - LOCATION: LOCATION: BACK

## 2025-03-11 NOTE — ED PROVIDER NOTES
Department of Emergency Medicine   ED  Provider Note  Admit Date/RoomTime: 3/10/2025  9:04 PM  ED Room: 04/04        History of Present Illness:  Chief Complaint   Patient presents with    Chest Pain         Janae Roman is a 49 y.o. female history of hypothyroidism, hypertension, coronary artery disease status post stent last year, reflux, obstructive sleep apnea, chronic kidney disease presents to the emergency department with complaints of chest pain onset of symptoms yesterday.  She has had lightheadedness and dizziness where she sees spots.  Feels short of breath has nausea no vomiting.  Reports she feels like she has a band that goes around her upper stomach.  Follows with Dr. Nelson cardiology last tress test was a year ago.  Patient reports she gets frequent EKGs secondary to her back injury.  Denies cough congestion runny nose sore throat.  Patient reports that she noticed that her blood pressure was fluctuating yesterday would be very low then spike high then low again.  Denies any fever or chills.  Patient also reports she is on Buspar for anxiety    Review of Systems:   Pertinent positives and negatives are stated within HPI, all other systems reviewed and are negative.        --------------------------------------------- PAST HISTORY ---------------------------------------------  Past Medical History:  has a past medical history of Acute hypotension (08/21/2024), Acute non-ST elevation myocardial infarction (NSTEMI) (Multi) (09/20/2024), Calculus of kidney (09/20/2024), Fracture of tooth (09/20/2024), Infected sebaceous cyst (09/20/2024), Lactic acidosis (09/20/2024), Personal history of other complications of pregnancy, childbirth and the puerperium, Personal history of urinary calculi (02/29/2016), and Toxic encephalopathy (08/22/2024).  Past Surgical History:  has a past surgical history that includes Lithotripsy (02/22/2016).  Social History:  reports that she has been smoking  cigarettes. She has never used smokeless tobacco. She reports that she does not drink alcohol and does not use drugs.  Family History: family history includes Breast cancer in her daughter and daughter; Heart disease in her brother; bypass in her father.. Unless otherwise noted, family history is non contributory  The patient’s home medications have been reviewed.  Allergies: Corticosteroids (glucocorticoids), Levofloxacin, Penicillins, Hydrocodone-acetaminophen, Methylprednisolone, Promethazine, Vancomycin hcl, and Morphine        ---------------------------------------------------PHYSICAL EXAM--------------------------------------    GENERAL APPEARANCE: Awake and alert.   VITAL SIGNS: As per the nurses' triage record.  Tachycardic 110  Psych: Flat affect appears somewhat anxious  HEENT: Normocephalic, atraumatic. Extraocular muscles are intact. Pupils equal round and reactive to light. Conjunctiva are pink. Negative scleral icterus. Mucous membranes are moist. Tongue in the midline. Pharynx was without erythema or exudates, uvula midline  NECK: Soft Nontender and supple, full gross ROM, no meningeal signs.  CHEST: Nontender to palpation. Clear to auscultation bilaterally. No rales, rhonchi, or wheezing.   HEART: S1, S2.  Tachycardic regular rate and rhythm. No murmurs, gallops or rubs.  Strong and equal pulses in the extremities.   ABDOMEN: Obese epigastric tenderness.  Soft, nontender, nondistended, positive bowel sounds, no palpable masses.  MUSCULCSKELETAL: The calves are nontender to palpation. Full gross active range of motion.   NEUROLOGICAL: Awake, alert and oriented x 3. Power intact in the upper and lower extremities. Sensation is intact to light touch in the upper and lower extremities.  NIH 0.  Test of skew.  IMMUNOLOGICAL: No lymphatic streaking noted   DERM: No petechiae, rashes, or ecchymoses.          ------------------------- NURSING NOTES AND VITALS REVIEWED ---------------------------  The  nursing notes within the ED encounter and vital signs as below have been reviewed by myself  /78   Pulse 98   Temp 36.9 °C (98.4 °F) (Temporal)   Resp 15   Ht 1.829 m (6')   Wt 133 kg (293 lb)   SpO2 (!) 92%   BMI 39.74 kg/m²     Oxygen Saturation Interpretation: 97% room air    The cardiac monitor revealed sinus tachycardia with a heart rate in the 110s as interpreted by me. The cardiac monitor was ordered secondary to the patient's heart rate and to monitor the patient for dysrhythmia.       The patient’s available past medical records and past encounters were reviewed.          -----------------------DIAGNOSTIC RESULTS------------------------  LABS:    Labs Reviewed   COMPREHENSIVE METABOLIC PANEL - Abnormal       Result Value    Glucose 169 (*)     Sodium 136      Potassium 4.7      Chloride 100      Bicarbonate 23      Anion Gap 18      Urea Nitrogen 33 (*)     Creatinine 1.26 (*)     eGFR 52 (*)     Calcium 10.7 (*)     Albumin 4.1      Alkaline Phosphatase 86      Total Protein 7.2      AST 46 (*)     Bilirubin, Total 0.4      ALT 46 (*)    CBC WITH AUTO DIFFERENTIAL - Abnormal    WBC 12.7 (*)     nRBC 0.0      RBC 4.00      Hemoglobin 13.9      Hematocrit 40.2       (*)     MCH 34.8 (*)     MCHC 34.6      RDW 12.6      Platelets 320      Neutrophils % 63.4      Immature Granulocytes %, Automated 1.3 (*)     Lymphocytes % 23.2      Monocytes % 7.4      Eosinophils % 4.1      Basophils % 0.6      Neutrophils Absolute 8.04 (*)     Immature Granulocytes Absolute, Automated 0.16      Lymphocytes Absolute 2.94      Monocytes Absolute 0.94      Eosinophils Absolute 0.52      Basophils Absolute 0.08     SERIAL TROPONIN-INITIAL - Normal    Troponin I, High Sensitivity 7      Narrative:     Less than 99th percentile of normal range cutoff-  Female and children under 18 years old <14 ng/L; Male <21 ng/L: Negative  Repeat testing should be performed if clinically indicated.     Female and children  under 18 years old 14-50 ng/L; Male 21-50 ng/L:  Consistent with possible cardiac damage and possible increased clinical   risk. Serial measurements may help to assess extent of myocardial damage.     >50 ng/L: Consistent with cardiac damage, increased clinical risk and  myocardial infarction. Serial measurements may help assess extent of   myocardial damage.      NOTE: Children less than 1 year old may have higher baseline troponin   levels and results should be interpreted in conjunction with the overall   clinical context.     NOTE: Troponin I testing is performed using a different   testing methodology at [facility] than at other   [facility]. Direct result comparisons should only   be made within the same method.   LIPASE - Normal    Lipase 42      Narrative:     Venipuncture immediately after or during the administration of Metamizole may lead to falsely low results. Testing should be performed immediately prior to Metamizole dosing.   SERIAL TROPONIN, 1 HOUR - Normal    Troponin I, High Sensitivity 7      Narrative:     Less than 99th percentile of normal range cutoff-  Female and children under 18 years old <14 ng/L; Male <21 ng/L: Negative  Repeat testing should be performed if clinically indicated.     Female and children under 18 years old 14-50 ng/L; Male 21-50 ng/L:  Consistent with possible cardiac damage and possible increased clinical   risk. Serial measurements may help to assess extent of myocardial damage.     >50 ng/L: Consistent with cardiac damage, increased clinical risk and  myocardial infarction. Serial measurements may help assess extent of   myocardial damage.      NOTE: Children less than 1 year old may have higher baseline troponin   levels and results should be interpreted in conjunction with the overall   clinical context.     NOTE: Troponin I testing is performed using a different   testing methodology at [facility] than at other   [facility].  Direct result comparisons should only   be made within the same method.   B-TYPE NATRIURETIC PEPTIDE - Normal    BNP 76      Narrative:        <100 pg/mL - Heart failure unlikely  100-299 pg/mL - Intermediate probability of acute heart                  failure exacerbation. Correlate with clinical                  context and patient history.    >=300 pg/mL - Heart Failure likely. Correlate with clinical                  context and patient history.    BNP testing is performed using different testing methodology at [facility] than at other [facility] hospitals. Direct result comparisons should only be made within the same method.      TROPONIN SERIES- (INITIAL, 1 HR)    Narrative:     The following orders were created for panel order Troponin I Series, High Sensitivity (0, 1 HR).  Procedure                               Abnormality         Status                     ---------                               -----------         ------                     Troponin I, High Sensiti...[[id number]]  Normal              Final result               Troponin, High Sensitivi...[[id number]]  Normal              Final result                 Please view results for these tests on the individual orders.       As interpreted by me, the above displayed labs are abnormal. All other labs obtained during this visit were within normal range or not returned as of this dictation.      EKG Interpretation    2128 ECG 12 lead  Performed at  2118, HR of 113, NSR, NAD, QTc 463, no sign of STEMI, no Q wave or T wave abnormality noted.    Reviewed and interpreted by me at time performed   [JM]           CT angio chest for pulmonary embolism   Final Result   No acute pulmonary embolus to the segmental level.        Mild dependent atelectasis.             MACRO:   None.        Signed by: Evan Finkelstein 3/11/2025 12:50 AM   Dictation workstation:   [id number]      XR chest 2 views   Final Result   No acute cardiopulmonary abnormality.    Signed by Ruddy Palomino MD              CT angio chest for pulmonary embolism   Final Result   No acute pulmonary embolus to the segmental level.        Mild dependent atelectasis.             MACRO:   None.        Signed by: Evan Finkelstein 3/11/2025 12:50 AM   Dictation workstation:   [id number]      XR chest 2 views   Final Result   No acute cardiopulmonary abnormality.   Signed by Ruddy Palomino MD              ------------------------------ ED COURSE/MEDICAL DECISION MAKING----------------------  Medical Decision Making:   Exam: A medically appropriate exam performed, outlined above, given the known history and presentation.    History obtained from: Review medical record nursing notes patient      Social Determinants of Health considered during this visit: Takes care of herself at home      PAST MEDICAL HISTORY/Chronic Conditions Affecting Care     has a past medical history of Acute hypotension (08/21/2024), Acute non-ST elevation myocardial infarction (NSTEMI) (Multi) (09/20/2024), Calculus of kidney (09/20/2024), Fracture of tooth (09/20/2024), Infected sebaceous cyst (09/20/2024), Lactic acidosis (09/20/2024), Personal history of other complications of pregnancy, childbirth and the puerperium, Personal history of urinary calculi (02/29/2016), and Toxic encephalopathy (08/22/2024).       CC/HPI Summary, Social Determinants of health, Records Reviewed, DDx, testing done/not done, ED Course, Reassessment, disposition considerations/shared decision making with patient, consults, disposition:   Presents with chest pain lightheadedness shortness of breath nausea  EKG, chest x-ray, CBC, CMP, lipase, troponin    Medical Decision Making/Differential Diagnosis:  Differentials include but not limited to acute coronary syndrome versus electrolyte abnormality versus biliary colic versus reflux versus musculoskeletal versus anxiety versus infectious process.  Review  Lipase 42  White blood cell count  12.7  Hemoglobin 13.9  Glucose 169 no signs of DKA  Electrolytes unremarkable  Creatinine 1.26 improved from baseline  LFTs mildly elevated  Troponin 7 repeat troponin 7  proBNP 76   Patient presented emergency department complaints of chest pain heart score 4 EKG per attending note no ST elevation or arrhythmia noted.  Troponins are 7 repeat troponin 7.  Patient improvement of pain lipase within normal limits mild elevation white blood cell count patient is not anemic.  Close 169 no signs of DKA renal function improved from baseline LFTs slightly elevated chest x-ray showed No acute cardiopulmonary abnormality.  CT angio of the chest showed No acute pulmonary embolus to the segmental level. Mild dependent atelectasis.  Based on patient's clinical presentation history and symptoms consistent with chest pain close follow-up with her cardiologist.  Leukocytosis no signs of infection at this time.  Follow-up with primary care physician for reevaluation likely inflammatory discharge per attending note.  No signs of sepsis or toxicity she is not tachycardic hypotensive or febrile.  Patient seen evaluated with attending physician Dr. Zafar       PROCEDURES  Unless otherwise noted below, none      CONSULTS:   None      ED Course as of 03/11/25 0348   Mon Mar 10, 2025   2100 Pre-hospital EKG reviewed, no STEMI [JM]   2128 ECG 12 lead  Performed at  2118, HR of 113, NSR, NAD, QTc 463, no sign of STEMI, no Q wave or T wave abnormality noted.    Reviewed and interpreted by me at time performed   [JM]   2322 Patient's cardiac workup negative has chest pain shortness of breath nausea lightheadedness tachycardic mild hypoxia CT angio of the chest ordered to evaluate for PE [TB]   e Mar 11, 2025   0011 Patient complaining of her chronic back pain.  Percocet ordered patient takes this at home awaiting CT results [TB]      ED Course User Index  [JM] Radha Jennings MD  [TB] Cathy Vásquez, APRN-CNP         Diagnoses  as of 03/11/25 0348   Acute chest pain   Epigastric pain         This patient has remained hemodynamically stable during their ED course.      Critical Care: none        Counseling:  The emergency provider has spoken with the patient and discussed today’s results, in addition to providing specific details for the plan of care and counseling regarding the diagnosis and prognosis.  Questions are answered at this time and they are agreeable with the plan.         --------------------------------- IMPRESSION AND DISPOSITION ---------------------------------    IMPRESSION  1. Acute chest pain    2. Epigastric pain        DISPOSITION  Disposition: Discharge home  Patient condition is stable improved        NOTE: This report was transcribed using voice recognition software. Every effort was made to ensure accuracy; however, inadvertent computerized transcription errors may be present      DAISY Baires-MAHI  03/11/25 0348

## 2025-03-11 NOTE — ED TRIAGE NOTES
Pt reports CP for 4 days that feels like a belt around her chest. Pt reports it had been constant. Took 3 NTG at home and was given1 by EMS. Pt also given 324mg ASA. Pt reports pain almost gone on arrival. States it felt like it did when she had her MI. Pt has history of stent placement

## 2025-03-11 NOTE — DISCHARGE INSTRUCTIONS
Thank you for choosing [facility]. It was my pleasure to be involved in your care today.         As of today's visit, based on reasonable likelihood, that it is safe for you to be discharged back to your residence to follow-up as an outpatient for ongoing management of your medical problem. You should follow-up with any referrals / primary provider as soon as possible. The contacts (number, addresses) are listed below.         Important:  Even though we think it is safe for you to go home, there is always a small chance that we are missing something that could require hospitalization.  Therefore it is very important that if you get worse or develops any new symptoms that you return here as soon as possible to be re-evaluated.  This includes return of symptoms that have resolved such as fainting, chest pain, or symptoms that could be warning signs for stroke important:  Even though we think it is safe for you to go home, there is always a small chance that we are missing something that could require hospitalization.  Therefore it is very important that if you get worse or develops any new symptoms that you return here as soon as possible to be re-evaluated.  This includes return of symptoms that have resolved such as fainting, chest pain, or symptoms that could be warning signs for stroke         Make sure your pharmacy and primary doctor is aware of any new medications prescribed today.          It is your responsibility to contact as soon as possible, and follow through with, any referrals you were given today. We do recommend you inform them you are a [facility] follow-up patient, as often they can better accommodate your need to be seen, provided their schedules allow. We will, and have, made every effort to ensure you have access to adequate follow-up specialists available.          All problems may not be able to be fixed in one ER visit. This is why timely ongoing care is important, and this  is a responsibility you share in. Further, you are free to follow up with any provider you choose, and this is not limited to our suggestion.          If cultures were obtained today, you will be contacted should anything result that would require further treatment. Please contact the ED at the number provided with questions.          Having trouble affording medications? Try [employer]! (This is not a hospital endorsed website, merely a recommendation based on my own personal experiences with [employer])

## 2025-03-17 DIAGNOSIS — I10 BENIGN ESSENTIAL HYPERTENSION: ICD-10-CM

## 2025-03-17 RX ORDER — LISINOPRIL AND HYDROCHLOROTHIAZIDE 12.5; 2 MG/1; MG/1
1 TABLET ORAL DAILY
Qty: 90 TABLET | Refills: 3 | Status: SHIPPED | OUTPATIENT
Start: 2025-03-17 | End: 2026-03-17

## 2025-06-09 ASSESSMENT — ENCOUNTER SYMPTOMS
LIGHT-HEADEDNESS: 0
BLOOD IN STOOL: 0
DIZZINESS: 0
ABDOMINAL PAIN: 0
HEADACHES: 0
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
## 2025-06-10 DIAGNOSIS — I25.2 HISTORY OF MI (MYOCARDIAL INFARCTION): ICD-10-CM

## 2025-06-10 DIAGNOSIS — F39 MOOD DISORDER: ICD-10-CM

## 2025-06-10 RX ORDER — NITROGLYCERIN 0.4 MG/1
0.4 TABLET SUBLINGUAL EVERY 5 MIN PRN
Qty: 30 TABLET | Refills: 5 | Status: SHIPPED | OUTPATIENT
Start: 2025-06-10

## 2025-06-10 RX ORDER — BUSPIRONE HYDROCHLORIDE 10 MG/1
20 TABLET ORAL 2 TIMES DAILY
Qty: 360 TABLET | Refills: 1 | Status: SHIPPED | OUTPATIENT
Start: 2025-06-10

## 2025-06-12 ENCOUNTER — OFFICE VISIT (OUTPATIENT)
Dept: PRIMARY CARE | Facility: CLINIC | Age: 50
End: 2025-06-12
Payer: COMMERCIAL

## 2025-06-12 VITALS
SYSTOLIC BLOOD PRESSURE: 134 MMHG | OXYGEN SATURATION: 98 % | WEIGHT: 292 LBS | HEART RATE: 111 BPM | BODY MASS INDEX: 39.6 KG/M2 | DIASTOLIC BLOOD PRESSURE: 78 MMHG

## 2025-06-12 DIAGNOSIS — E53.9 VITAMIN B DEFICIENCY: ICD-10-CM

## 2025-06-12 DIAGNOSIS — K04.7 TOOTH ABSCESS: ICD-10-CM

## 2025-06-12 DIAGNOSIS — G47.33 OSA (OBSTRUCTIVE SLEEP APNEA): ICD-10-CM

## 2025-06-12 DIAGNOSIS — E66.9 OBESITY WITH BODY MASS INDEX 30 OR GREATER: ICD-10-CM

## 2025-06-12 DIAGNOSIS — E03.9 ACQUIRED HYPOTHYROIDISM: ICD-10-CM

## 2025-06-12 DIAGNOSIS — Z23 ENCOUNTER FOR IMMUNIZATION: ICD-10-CM

## 2025-06-12 DIAGNOSIS — Z00.00 ROUTINE MEDICAL EXAM: Primary | ICD-10-CM

## 2025-06-12 DIAGNOSIS — I25.2 HISTORY OF NON-ST ELEVATION MYOCARDIAL INFARCTION (NSTEMI): ICD-10-CM

## 2025-06-12 DIAGNOSIS — E78.5 DYSLIPIDEMIA: ICD-10-CM

## 2025-06-12 DIAGNOSIS — Z12.31 BREAST CANCER SCREENING BY MAMMOGRAM: ICD-10-CM

## 2025-06-12 DIAGNOSIS — N17.9 AKI (ACUTE KIDNEY INJURY): ICD-10-CM

## 2025-06-12 DIAGNOSIS — E11.9 TYPE 2 DIABETES MELLITUS WITHOUT COMPLICATION, WITHOUT LONG-TERM CURRENT USE OF INSULIN: ICD-10-CM

## 2025-06-12 DIAGNOSIS — I10 BENIGN ESSENTIAL HYPERTENSION: ICD-10-CM

## 2025-06-12 DIAGNOSIS — N18.31 STAGE 3A CHRONIC KIDNEY DISEASE (MULTI): ICD-10-CM

## 2025-06-12 DIAGNOSIS — R71.8 ELEVATED MCV: ICD-10-CM

## 2025-06-12 DIAGNOSIS — R73.03 PREDIABETES: ICD-10-CM

## 2025-06-12 PROBLEM — I20.9 ANGINA PECTORIS: Status: RESOLVED | Noted: 2023-11-01 | Resolved: 2025-06-12

## 2025-06-12 LAB — POC HEMOGLOBIN A1C: 7.6 % (ref 4.2–6.5)

## 2025-06-12 PROCEDURE — 99396 PREV VISIT EST AGE 40-64: CPT | Performed by: PHYSICIAN ASSISTANT

## 2025-06-12 PROCEDURE — 99214 OFFICE O/P EST MOD 30 MIN: CPT | Mod: 25 | Performed by: PHYSICIAN ASSISTANT

## 2025-06-12 PROCEDURE — 90677 PCV20 VACCINE IM: CPT | Performed by: PHYSICIAN ASSISTANT

## 2025-06-12 PROCEDURE — RXMED WILLOW AMBULATORY MEDICATION CHARGE

## 2025-06-12 PROCEDURE — 3075F SYST BP GE 130 - 139MM HG: CPT | Performed by: PHYSICIAN ASSISTANT

## 2025-06-12 PROCEDURE — 3051F HG A1C>EQUAL 7.0%<8.0%: CPT | Performed by: PHYSICIAN ASSISTANT

## 2025-06-12 PROCEDURE — 3078F DIAST BP <80 MM HG: CPT | Performed by: PHYSICIAN ASSISTANT

## 2025-06-12 PROCEDURE — 99396 PREV VISIT EST AGE 40-64: CPT | Mod: 25 | Performed by: PHYSICIAN ASSISTANT

## 2025-06-12 PROCEDURE — 83036 HEMOGLOBIN GLYCOSYLATED A1C: CPT | Performed by: PHYSICIAN ASSISTANT

## 2025-06-12 PROCEDURE — 99214 OFFICE O/P EST MOD 30 MIN: CPT | Performed by: PHYSICIAN ASSISTANT

## 2025-06-12 RX ORDER — TIRZEPATIDE 2.5 MG/.5ML
2.5 INJECTION, SOLUTION SUBCUTANEOUS WEEKLY
Qty: 2 ML | Refills: 0 | Status: SHIPPED | OUTPATIENT
Start: 2025-06-12 | End: 2025-06-12 | Stop reason: WASHOUT

## 2025-06-12 RX ORDER — DULAGLUTIDE 0.75 MG/.5ML
0.75 INJECTION, SOLUTION SUBCUTANEOUS WEEKLY
Qty: 2 ML | Refills: 0 | Status: SHIPPED | OUTPATIENT
Start: 2025-06-12

## 2025-06-12 RX ORDER — CLINDAMYCIN HYDROCHLORIDE 300 MG/1
300 CAPSULE ORAL 4 TIMES DAILY
Qty: 28 CAPSULE | Refills: 0 | Status: SHIPPED | OUTPATIENT
Start: 2025-06-12 | End: 2025-06-19

## 2025-06-12 ASSESSMENT — PAIN SCALES - GENERAL: PAINLEVEL_OUTOF10: 10-WORST PAIN EVER

## 2025-06-12 ASSESSMENT — ENCOUNTER SYMPTOMS
ABDOMINAL PAIN: 0
LIGHT-HEADEDNESS: 0
NECK PAIN: 1
NERVOUS/ANXIOUS: 1
SHORTNESS OF BREATH: 0
DIZZINESS: 0
SHORTNESS OF BREATH: 1
HEADACHES: 0
BLOOD IN STOOL: 0
BACK PAIN: 1

## 2025-06-12 NOTE — PROGRESS NOTES
Subjective   Patient ID: Janae Roman is a 49 y.o. female who presents for Annual Exam (Pt is here for physical / nr/Pt would like to discuss going on weight loss options to help with pain ).    HPI   Hx of chronic back pain, CAD s/p PDA RCA stent, EMILIE, LYNDSAY/MDD, Hashimoto thyroiditis, neuropathy, nephrolithiasis, hepatic steatosis, CKD, T2DM    Hypothyroid - had meds adjusted (increased to 137mcg) and was due to repeat labs but is overdue.    T2DM - A1c 6.6 --> 7.6.     Foot exam -   Eye exam -   Microalbumin - ordered today   PNA vaccine - due for [id number]   Statin - 80mg atorvastatin   ACE - 20mg lisinopril    HTN/ASCVD - stable on 20-12.5mg lisinopril-hydrochlorothiazide, 100mg metoprolol, 50mg spironolactone. nitroglycerin PRN    CKD - last GFR 52, Scr 1.26.    COPD - PFT consistent w/emphysema, started on spiriva.    Anxiety - stable on buspar    HM: cologuard 1/21/23, due for mammogram    Review of Systems   HENT:  Negative for hearing loss.    Eyes:  Negative for visual disturbance.   Respiratory:  Negative for shortness of breath.    Cardiovascular:  Negative for chest pain.   Gastrointestinal:  Negative for abdominal pain and blood in stool.   Genitourinary:  Negative for vaginal bleeding.   Neurological:  Negative for dizziness, light-headedness and headaches.       Objective   /78   Pulse (!) 111   Wt 132 kg (292 lb)   SpO2 98%   BMI 39.60 kg/m²     Physical Exam  Vitals reviewed.   Constitutional:       Appearance: Normal appearance.   HENT:      Head: Normocephalic and atraumatic.      Right Ear: Tympanic membrane and ear canal normal.      Left Ear: Tympanic membrane and ear canal normal.      Nose: Nose normal.      Mouth/Throat:      Mouth: Mucous membranes are moist.      Pharynx: No oropharyngeal exudate.   Eyes:      Extraocular Movements: Extraocular movements intact.      Conjunctiva/sclera: Conjunctivae normal.      Pupils: Pupils are equal, round, and reactive to light.    Cardiovascular:      Rate and Rhythm: Normal rate and regular rhythm.      Heart sounds: Normal heart sounds.   Pulmonary:      Effort: Pulmonary effort is normal.      Breath sounds: Normal breath sounds. No wheezing.   Abdominal:      General: There is no distension.      Palpations: Abdomen is soft.      Tenderness: There is no abdominal tenderness.   Musculoskeletal:         General: No tenderness.      Cervical back: Normal range of motion and neck supple.   Skin:     General: Skin is warm and dry.      Findings: No rash.   Neurological:      General: No focal deficit present.      Mental Status: She is alert. Mental status is at baseline.   Psychiatric:         Mood and Affect: Mood normal.         Assessment/Plan   Problem List Items Addressed This Visit           ICD-10-CM    Acquired hypothyroidism E03.9    Benign essential hypertension I10    EMILIE (obstructive sleep apnea) G47.33    Prediabetes R73.03    Dyslipidemia E78.5    Obesity with body mass index 30 or greater E66.9    Stage 3 chronic kidney disease (Multi) N18.30    Type 2 diabetes mellitus without complication, without long-term current use of insulin E11.9     Other Visit Diagnoses         Codes      Routine medical exam    -  Primary Z00.00      Elevated MCV     R71.8      Vitamin B deficiency     E53.9      KELLY (acute kidney injury)     N17.9      Encounter for immunization     Z23      Breast cancer screening by mammogram     Z12.31

## 2025-06-24 ENCOUNTER — TELEPHONE (OUTPATIENT)
Dept: PRIMARY CARE | Facility: CLINIC | Age: 50
End: 2025-06-24
Payer: COMMERCIAL

## 2025-06-24 NOTE — TELEPHONE ENCOUNTER
Pt called back to notify Mago of the cancer that pt's sister has, which is capillary thyroid cancer, stage 2. Also, pt states that she went to her dentist appt last week. Has not had a chance to schedule anything else due to pt having to call CPS on her daughter and granddaughter.

## 2025-06-25 ENCOUNTER — PHARMACY VISIT (OUTPATIENT)
Dept: PHARMACY | Facility: CLINIC | Age: 50
End: 2025-06-25
Payer: MEDICAID

## 2025-06-25 ENCOUNTER — HOSPITAL ENCOUNTER (EMERGENCY)
Facility: HOSPITAL | Age: 50
Discharge: HOME | End: 2025-06-25
Attending: STUDENT IN AN ORGANIZED HEALTH CARE EDUCATION/TRAINING PROGRAM
Payer: COMMERCIAL

## 2025-06-25 VITALS
TEMPERATURE: 98.2 F | OXYGEN SATURATION: 100 % | HEIGHT: 72 IN | DIASTOLIC BLOOD PRESSURE: 82 MMHG | RESPIRATION RATE: 16 BRPM | BODY MASS INDEX: 39.55 KG/M2 | HEART RATE: 86 BPM | WEIGHT: 292 LBS | SYSTOLIC BLOOD PRESSURE: 116 MMHG

## 2025-06-25 DIAGNOSIS — G89.29 ACUTE EXACERBATION OF CHRONIC LOW BACK PAIN: Primary | ICD-10-CM

## 2025-06-25 DIAGNOSIS — M54.50 ACUTE EXACERBATION OF CHRONIC LOW BACK PAIN: Primary | ICD-10-CM

## 2025-06-25 PROCEDURE — 2500000005 HC RX 250 GENERAL PHARMACY W/O HCPCS: Performed by: STUDENT IN AN ORGANIZED HEALTH CARE EDUCATION/TRAINING PROGRAM

## 2025-06-25 PROCEDURE — 96372 THER/PROPH/DIAG INJ SC/IM: CPT | Performed by: STUDENT IN AN ORGANIZED HEALTH CARE EDUCATION/TRAINING PROGRAM

## 2025-06-25 PROCEDURE — 2500000001 HC RX 250 WO HCPCS SELF ADMINISTERED DRUGS (ALT 637 FOR MEDICARE OP): Performed by: STUDENT IN AN ORGANIZED HEALTH CARE EDUCATION/TRAINING PROGRAM

## 2025-06-25 PROCEDURE — 99284 EMERGENCY DEPT VISIT MOD MDM: CPT | Performed by: STUDENT IN AN ORGANIZED HEALTH CARE EDUCATION/TRAINING PROGRAM

## 2025-06-25 PROCEDURE — 2500000004 HC RX 250 GENERAL PHARMACY W/ HCPCS (ALT 636 FOR OP/ED): Mod: JZ | Performed by: STUDENT IN AN ORGANIZED HEALTH CARE EDUCATION/TRAINING PROGRAM

## 2025-06-25 PROCEDURE — 2500000002 HC RX 250 W HCPCS SELF ADMINISTERED DRUGS (ALT 637 FOR MEDICARE OP, ALT 636 FOR OP/ED): Performed by: STUDENT IN AN ORGANIZED HEALTH CARE EDUCATION/TRAINING PROGRAM

## 2025-06-25 RX ORDER — TIZANIDINE 4 MG/1
6 TABLET ORAL ONCE
Status: COMPLETED | OUTPATIENT
Start: 2025-06-25 | End: 2025-06-25

## 2025-06-25 RX ORDER — OXYCODONE AND ACETAMINOPHEN 5; 325 MG/1; MG/1
1 TABLET ORAL ONCE
Refills: 0 | Status: COMPLETED | OUTPATIENT
Start: 2025-06-25 | End: 2025-06-25

## 2025-06-25 RX ORDER — PREDNISONE 20 MG/1
40 TABLET ORAL DAILY
Qty: 10 TABLET | Refills: 0 | Status: SHIPPED | OUTPATIENT
Start: 2025-06-25 | End: 2025-06-30

## 2025-06-25 RX ORDER — LIDOCAINE 560 MG/1
1 PATCH PERCUTANEOUS; TOPICAL; TRANSDERMAL ONCE
Status: DISCONTINUED | OUTPATIENT
Start: 2025-06-25 | End: 2025-06-25 | Stop reason: HOSPADM

## 2025-06-25 RX ORDER — KETOROLAC TROMETHAMINE 30 MG/ML
30 INJECTION, SOLUTION INTRAMUSCULAR; INTRAVENOUS ONCE
Status: COMPLETED | OUTPATIENT
Start: 2025-06-25 | End: 2025-06-25

## 2025-06-25 RX ORDER — ORPHENADRINE CITRATE 30 MG/ML
60 INJECTION INTRAMUSCULAR; INTRAVENOUS ONCE
Status: COMPLETED | OUTPATIENT
Start: 2025-06-25 | End: 2025-06-25

## 2025-06-25 RX ADMIN — OXYCODONE HYDROCHLORIDE AND ACETAMINOPHEN 1 TABLET: 5; 325 TABLET ORAL at 04:48

## 2025-06-25 RX ADMIN — KETOROLAC TROMETHAMINE 30 MG: 30 INJECTION, SOLUTION INTRAMUSCULAR at 03:48

## 2025-06-25 RX ADMIN — ORPHENADRINE CITRATE 60 MG: 60 INJECTION INTRAMUSCULAR; INTRAVENOUS at 03:47

## 2025-06-25 RX ADMIN — LIDOCAINE 4% 1 PATCH: 40 PATCH TOPICAL at 03:46

## 2025-06-25 RX ADMIN — TIZANIDINE 6 MG: 4 TABLET ORAL at 04:48

## 2025-06-25 ASSESSMENT — PAIN - FUNCTIONAL ASSESSMENT: PAIN_FUNCTIONAL_ASSESSMENT: 0-10

## 2025-06-25 ASSESSMENT — PAIN SCALES - GENERAL
PAINLEVEL_OUTOF10: 8
PAINLEVEL_OUTOF10: 6

## 2025-06-25 ASSESSMENT — PAIN DESCRIPTION - PROGRESSION: CLINICAL_PROGRESSION: GRADUALLY IMPROVING

## 2025-06-25 NOTE — ED PROVIDER NOTES
"Emergency Department Provider Note       History of Present Illness     History provided by: {History Provided By:11028}  Limitations to History: {History Limitations:94961}  External Records Reviewed with Brief Summary: {ED External Records Reviewed with Brief Summary:44296}    HPI:  Janae Roman is a 49 y.o. female ***    Physical Exam   Triage vitals:  T 36.8 °C (98.2 °F)  HR 98  BP (!) 121/105  RR 18  O2 96 % None (Room air)    {ED Physical Exam:15741}      Medical Decision Making & ED Course   Medical Decision Makin y.o. female ***  ----  {Scoring Tools Utilized:94832}    Social Determinants of Health which Significantly Impact Care: {Social Determinants of Health which Significantly Impact Care:11113} {The following actions were taken to address these social determinants (Optional):41982}    EKG Independent Interpretation: {EKG Independent Interpretation:89368}    Independent Result Review and Interpretation: {Independent Result Review and Interpretation:78996::\"Relevant laboratory and radiographic results were reviewed and independently interpreted by myself.  As necessary, they are commented on in the ED Course.\"}    Chronic conditions affecting the patient's care: {Chronic conditions affecting the patient's care:77502::\"As documented above in MDM\"}    The patient was discussed with the following consultants/services: {The patient was discussed with the following consultants/services:33929}    Care Considerations: {Care Considerations:65349::\"As documented above in MDM\"}    ED Course:  Diagnoses as of 25 0509   Acute exacerbation of chronic low back pain       Disposition   {ED Disposition:55149}    Procedures   Procedures    Radha Jennings MD  Emergency Medicine       " weakness.      Coordination: Finger-Nose-Finger Test and Heel to Shin Test normal.   Psychiatric:         Mood and Affect: Mood normal.         Behavior: Behavior normal.           Medical Decision Making & ED Course   Medical Decision Makin y.o. female presents with back pain.  Considered wide ddx for back/hip pain including trauma/fx, epidural abscess, cord compression, pyelonephritis, aortic pathology -- all less/not c/w H&P and supportive studies.  No new numbness/weakness/tingling in legs, bowel/bladder incontinence, new trauma, fever, unexpected weight loss, h/o cancer, h/o IVDU, or indwelling lines. No midline CT LS spine tenderness palpation or step-offs.  5 out of 5 strength in hip and knee flexion extension as well as ankle and great toe plantar dorsiflexion bilaterally.  Normal sensation to light touch in L1-S4 nerve roots bilaterally.  2+ DP pulses bilaterally.  No concern for osteomyelitis, discitis, epidural abscess, cord compression, fracture, or cauda equina.  I do not believe they require MRI or CT imaging at this time.  Patient treated with several medications in the ED with improvement.  Patient stable for discharge with return precautions.     ----  Social Determinants of Health which Significantly Impact Care: Social Determinants of Health which Significantly Impact Care: None identified     EKG Independent Interpretation: EKG not obtained    Independent Result Review and Interpretation: Relevant laboratory and radiographic results were reviewed and independently interpreted by myself.  As necessary, they are commented on in the ED Course.    Chronic conditions affecting the patient's care: As documented above in [facility]    The patient was discussed with the following consultants/services: None    Care Considerations: As documented above in [facility]    ED Course:  Diagnoses as of 25 0509   Acute exacerbation of chronic low back pain       Disposition   As a result of the work-up, the patient  was discharged home.  she was informed of her diagnosis and instructed to come back with any concerns or worsening of condition.  she and was agreeable to the plan as discussed above.  she was given the opportunity to ask questions.  All of the patient's questions were answered.    Procedures   Procedures    Radha Jennings MD  Emergency Medicine          Radha Jennings MD  07/14/25 [id number]

## 2025-06-25 NOTE — DISCHARGE INSTRUCTIONS
You were seen in the Emergency Department today for low back pain.  This is a chronic issue and you appear to have exacerbated a chronic issue.  We treated you with several medications as well as an increase of your normal medications.  I anticipate that your back pain will improve over time.  If you have any other concerns, you can return for reevaluation.

## 2025-07-08 ENCOUNTER — APPOINTMENT (OUTPATIENT)
Dept: PHARMACY | Facility: HOSPITAL | Age: 50
End: 2025-07-08
Payer: COMMERCIAL

## 2025-07-08 DIAGNOSIS — Z79.4 TYPE 2 DIABETES MELLITUS WITH HYPERGLYCEMIA, WITH LONG-TERM CURRENT USE OF INSULIN: ICD-10-CM

## 2025-07-08 DIAGNOSIS — E11.9 TYPE 2 DIABETES MELLITUS WITHOUT COMPLICATION, WITHOUT LONG-TERM CURRENT USE OF INSULIN: Primary | ICD-10-CM

## 2025-07-08 DIAGNOSIS — E11.65 TYPE 2 DIABETES MELLITUS WITH HYPERGLYCEMIA, WITH LONG-TERM CURRENT USE OF INSULIN: ICD-10-CM

## 2025-07-08 PROCEDURE — RXMED WILLOW AMBULATORY MEDICATION CHARGE

## 2025-07-08 RX ORDER — DULAGLUTIDE 1.5 MG/.5ML
1.5 INJECTION, SOLUTION SUBCUTANEOUS
Qty: 2 ML | Refills: 3 | Status: SHIPPED | OUTPATIENT
Start: 2025-07-08

## 2025-07-08 RX ORDER — LANCETS
EACH MISCELLANEOUS
Qty: 100 EACH | Refills: 11 | Status: SHIPPED | OUTPATIENT
Start: 2025-07-08

## 2025-07-08 RX ORDER — BLOOD-GLUCOSE,RECEIVER,CONT
EACH MISCELLANEOUS
Qty: 1 EACH | Refills: 0 | Status: SHIPPED | OUTPATIENT
Start: 2025-07-08

## 2025-07-08 RX ORDER — BLOOD SUGAR DIAGNOSTIC
STRIP MISCELLANEOUS
Qty: 50 EACH | Refills: 11 | Status: SHIPPED | OUTPATIENT
Start: 2025-07-08

## 2025-07-08 RX ORDER — ASPIRIN 81 MG/1
81 TABLET ORAL DAILY
COMMUNITY

## 2025-07-08 RX ORDER — DEXTROSE 4 G
TABLET,CHEWABLE ORAL
Qty: 1 EACH | Refills: 0 | Status: SHIPPED | OUTPATIENT
Start: 2025-07-08

## 2025-07-08 RX ORDER — BLOOD-GLUCOSE SENSOR
EACH MISCELLANEOUS
Qty: 2 EACH | Refills: 11 | Status: SHIPPED | OUTPATIENT
Start: 2025-07-08

## 2025-07-08 NOTE — Clinical Note
Spoke with pt about diabetes and new med Trulicity. Has taken 3 doses so far and is doing well with no side effects. Discussed CGM to monitor glucose, she is interested in this. Rxs sent for [facility] and Trulicity dose increase to help with glycemic control and weight loss efforts. Let me know if any questions!

## 2025-07-08 NOTE — PROGRESS NOTES
Clinical Pharmacy Appointment    Patient ID: Janae Roman is a 49 y.o. female who presents for Diabetes.    Pt is here for First appointment.     Referring Provider: Maria A Baker PA-C  PCP: Maria A Baker PA-C   Last visit with PCP: 06/12/25   Next visit with PCP: 12/11/25    Subjective   Medication Reconciliation:  Changed: n/a  Added: ASA 81mg  Discontinued: Advil Dual Action, Nitrodur patch    Drug Interactions  No relevant drug interactions were noted.    Medication System Management  Patient's preferred pharmacy: [facility]  Adherence/Organization: no issues  Affordability/Accessibility: covered by insurance    HPI    DIABETES MELLITUS TYPE 2:    Diagnosed with diabetes:  02/2024. Known diabetic complications: CAD, neuropathy, CKD  Does patient follow with Endocrinology: No  Last optometry exam: has referral, last done 2 years ago per pt  Most recent visit in Podiatry: n/a-- patient denies sores or cuts on feet today      Current diabetic medications include:  Trulicity 0.75mg once weekly  Taking 3rd injection tomorrow    Clarifications to above regimen: n/a  Adverse Effects: denies any issues with side effects    Past diabetic medications include:  Metformin - hives    Glucose Readings:  Glucometer/CGM Type: n/a    Current home BG readings (mg/dL): not testing, no supplies   Previous home BG readings (mg/dL): n/a    Interested in CGM since insurance will cover - apt set up for training and rxs sent    Any episodes of hypoglycemia? No,  .  Did patient treat episode of hypoglycemia appropriately? N/A  Does the patient have a prescription for ready-to-use Glucagon? Not on insulin  Does pt have proteinuria? lab ordered    Lifestyle:  Diet: Middle of moving, so diet not very good right now  Physical Activity: significant chronic pain, limited to movement around house, cannot stand over 1 minute    Secondary Prevention:  Statin? Yes  ACE-I/ARB? Yes  Aspirin? Yes    Pertinent PMH Review:  PMH of Pancreatitis:  No  PMH of Retinopathy: No  PMH of Urinary Tract Infections: Yes - 34 years ago after giving birth to daughter   PMH of MTC: No  Little sister had papillary     Immunizations:  Influenza? No  COVID? No  Pneumonia? Yes  Shingles? No    Objective   Allergies[1]  Social History     Social History Narrative    Not on file      Medication Review  Current Outpatient Medications   Medication Instructions    albuterol 2.5 mg, nebulization, Every 6 hours PRN    amitriptyline (ELAVIL) 10-20 mg, 2 times daily    aspirin 81 mg, Daily    atorvastatin (LIPITOR) 80 mg, oral, Daily    blood-glucose sensor (FreeStyle Jerrell 3 Plus Sensor) device Use to monitor glucose, change every 15 days    blood-glucose,,cont (FreeStyle Jerrell 3 [place]) misc Use as instructed to monitor glucose    busPIRone (BUSPAR) 20 mg, oral, 2 times daily    clindamycin (Cleocin T) 1 % gel Topical, 2 times daily, apply to affected area    gabapentin (Neurontin) 800 mg tablet TAKE 1 TABLET (800 MG) BY MOUTH 4 TIMES A DAY    levothyroxine (SYNTHROID, LEVOXYL) 137 mcg, oral, Daily    lisinopriL-hydrochlorothiazide 20-12.5 mg tablet 1 tablet, oral, Daily    metoprolol succinate XL (Toprol-XL) 100 mg 24 hr tablet TAKE 1 TABLET IN THE MORNING AND TAKE 1/2 TABLET IN THE EVENING ORALLY EVERY DAY 90 DAYS    nitroglycerin (NITROSTAT) 0.4 mg, sublingual, Every 5 min PRN    omeprazole (PriLOSEC) 40 mg DR capsule 1 capsule, Daily before breakfast    oxyCODONE-acetaminophen (Percocet) 7.5-325 mg tablet 1 tablet, Every 6 hours PRN    spironolactone (ALDACTONE) 50 mg, oral, 2 times daily    tiotropium (Spiriva Respimat) 1.25 mcg/actuation inhaler 2 puffs, inhalation, Daily    tiZANidine (Zanaflex) 4 mg tablet 1 tablet, 3 times daily PRN    Trulicity 1.5 mg, subcutaneous, Every 7 days      Vitals  BP Readings from Last 2 Encounters:   06/25/25 116/82   06/12/25 134/78     BMI Readings from Last 1 Encounters:   06/25/25 39.60 kg/m²      Labs  A1C  Lab Results   Component  "Value Date    HGBA1C 7.6 (A) 06/12/2025    HGBA1C 6.6 (A) 02/08/2024    HGBA1C 6.0 06/17/2022     BMP  Lab Results   Component Value Date    CALCIUM 10.7 (H) 03/10/2025     03/10/2025    K 4.7 03/10/2025    CO2 23 03/10/2025     03/10/2025    BUN 33 (H) 03/10/2025    CREATININE 1.26 (H) 03/10/2025    EGFR 52 (L) 03/10/2025     LFTs  Lab Results   Component Value Date    ALT 46 (H) 03/10/2025    AST 46 (H) 03/10/2025    ALKPHOS 86 03/10/2025    BILITOT 0.4 03/10/2025     FLP  Lab Results   Component Value Date    TRIG 487 (H) 02/22/2024    CHOL 235 (H) 02/22/2024    LDLCALC  02/22/2024      Comment:      Unable to report calculated LDL due to elevated  Triglycerides. Please order a Direct LDL if required.    HDL 35.0 (L) 02/22/2024     Urine Microalbumin  No results found for: \"MICROALBCREA\"  Weight Management  Wt Readings from Last 3 Encounters:   06/25/25 132 kg (292 lb)   06/12/25 132 kg (292 lb)   03/10/25 133 kg (293 lb)      There is no height or weight on file to calculate BMI.     Assessment/Plan   Problem List Items Addressed This Visit       Type 2 diabetes mellitus without complication, without long-term current use of insulin - Primary    Relevant Medications    dulaglutide (Trulicity) 1.5 mg/0.5 mL pen injector injection    blood-glucose,,cont (FreeStyle Jerrell 3 [place]) misc    blood-glucose sensor (FreeStyle Jerrell 3 Plus Sensor) device    Other Relevant Orders    Referral to Clinical Pharmacy     Patient's goal A1c is < 7%.  Is pt at goal? No, 7.6%  Patient's SMBGs are n/a.     Rationale for plan: increase GLP1, goal of weight loss along with glycemic control.    Medication Changes:    INCREASE  Trulicity 1.5mg subcutaneous once weekly    Future Considerations:  Further GLP1 titration  CGM    Monitoring and Education:  Patient received the following medication education  on Trulicity:    - Counseled patient on Trulicity MOA, expectations, side effects, duration of therapy, " administration, and monitoring parameters.  - Provided detailed dosing and administration counseling to ensure proper technique.   - Reviewed Trulicity titration schedule, starting with 0.75 mg once weekly to 1.5 mg, 3 mg, and if tolerated 4.5 mg.  - Counseled patient on the benefits of GLP-1ra, such as cardiovascular risk reduction, glycemic control, and weight loss potential.  - Reviewed storage requirements of Trulicity when not in use, and when to administer the medication if a dose is missed.  - Advised patient that they may experience improved satiety after meals and portion sizes of meals may be reduced as doses of Trulicity increase.    Medication education provided by BUNNY Sandoval, PharmD, [facility]    Clinical Pharmacist follow-up: 2 weeks, In-Person visit  Patient is not followed in [facility]. (If yes, track minutes under compass kris at each visit)    Continue all meds under the continuation of care with the referring provider and clinical pharmacy team.    Verbal consent to manage patient's drug therapy was obtained from the patient. They were informed they may decline to participate or withdraw from participation in pharmacy services at any time.         [1]   Allergies  Allergen Reactions    Corticosteroids (Glucocorticoids) Hives, Itching and Shortness of breath     Pt does not know which steroid she was taking.    Levofloxacin Rash     Red kyra syndrome    Penicillins Shortness of breath    Hydrocodone-Acetaminophen Hives    Methylprednisolone Hives and Nausea/vomiting     States she takes benadryl with it and it works    Promethazine Hives    Vancomycin Hcl Hives    Morphine Hives and Rash

## 2025-07-09 ENCOUNTER — PHARMACY VISIT (OUTPATIENT)
Dept: PHARMACY | Facility: CLINIC | Age: 50
End: 2025-07-09
Payer: MEDICAID

## 2025-07-22 DIAGNOSIS — E03.9 ACQUIRED HYPOTHYROIDISM: ICD-10-CM

## 2025-07-22 RX ORDER — LEVOTHYROXINE SODIUM 137 UG/1
137 TABLET ORAL DAILY
Qty: 90 TABLET | Refills: 0 | Status: SHIPPED | OUTPATIENT
Start: 2025-07-22 | End: 2026-07-22

## 2025-07-24 ENCOUNTER — CLINICAL SUPPORT (OUTPATIENT)
Dept: PRIMARY CARE | Facility: CLINIC | Age: 50
End: 2025-07-24
Payer: COMMERCIAL

## 2025-07-24 DIAGNOSIS — E11.9 TYPE 2 DIABETES MELLITUS WITHOUT COMPLICATION, WITHOUT LONG-TERM CURRENT USE OF INSULIN: Primary | ICD-10-CM

## 2025-07-24 NOTE — PROGRESS NOTES
Janae Roman presents to the office for her CGM personal start education and training. She is using Freestyle Jerrell 3. She is obtaining her supplies from pharmacy.     Referring Provider: Maria A MURILLO    Lab Results   Component Value Date    HGBA1C 7.6 (A) 06/12/2025     Lab Results   Component Value Date    GLUCOSE 169 (H) 03/10/2025    CREATININE 1.26 (H) 03/10/2025    EGFR 52 (L) 03/10/2025     Lab Results   Component Value Date    CREATININE 1.26 (H) 03/10/2025     Lab Results   Component Value Date    CHOL 235 (H) 02/22/2024     Lab Results   Component Value Date    HDL 35.0 (L) 02/22/2024     Lab Results   Component Value Date    LDLCALC  02/22/2024      Comment:      Unable to report calculated LDL due to elevated  Triglycerides. Please order a Direct LDL if required.     Lab Results   Component Value Date    TRIG 487 (H) 02/22/2024       CURRENT PHARMACOTHERAPY  Medications Ordered Prior to Encounter[1]    Allergies[2]    Current DM medications:  Trulicity 1.5mg once weekly  - denies side effects  - has taken 1 dose, accidentally wasted first one due to leaving cap on  SMBG: not testing  Brought in regular glucometer supplies as well  Reviewed use and took finger poke reading - 199 (ate and drank pop prior to apt)  CGM: Jerrell 3 personal start completed today in office  Using smartphone yesy as reader  Alarms adjusted and pt connected in [employer]  Encouraged to review weekly time in target and GMI for glycemic improvement  Hypoglycemia tx/sx/prevention: denies issues with low glucose  Reviewed sx and treatment  Advised to carry glucose source at all times    Patient received the following instructions for Jerrell personal start: Sensor application and start up of sensor; aware to change in 15 days. Products for better adhesion; skin tac and simpatch. Expected differences in sensor glucose and blood glucose; always check with meter if symptoms do not match sensor glucose of if magnify glass appears on  display. Trend arrows. [place] functions: target ranges set to ; alerts set if applicable. Creating a [employer] account if using reader and connecting to practice. Bring reader to all office visits, download reader 1 week before all office visits. Remove for MRI, CAT scan and X-ray. Call [facility] for problems with sensor, they will replace. Patient correctly applied sensor to back of upper arm and sensor session started.  Handouts: Jerrell 3 Plus Getting Started booklet  Freestyle Jerrell 3 personal start education and training provided by BUNNY Sandoval, PharmD, BCPS         [1]   Current Outpatient Medications on File Prior to Visit   Medication Sig Dispense Refill    albuterol 2.5 mg /3 mL (0.083 %) nebulizer solution Take 3 mL (2.5 mg) by nebulization every 6 hours if needed for wheezing. 300 mL 0    amitriptyline (Elavil) 10 mg tablet Take 1-2 tablets (10-20 mg) by mouth 2 times a day. 1 tablet in the morning / 2 tablet at night      aspirin 81 mg EC tablet Take 1 tablet (81 mg) by mouth once daily.      atorvastatin (Lipitor) 80 mg tablet TAKE 1 TABLET BY MOUTH EVERY DAY 90 tablet 3    blood sugar diagnostic (Accu-Chek Guide test strips) Use to test glucose once daily 50 each 11    blood-glucose meter (Accu-Chek Guide Glucose Meter) misc Use as directed to test glucose once daily 1 each 0    blood-glucose sensor (FreeStyle Jerrell 3 Plus Sensor) device Use to monitor glucose, change every 15 days 2 each 11    blood-glucose,,cont (FreeStyle Jerrell 3 [place]) misc Use as instructed to monitor glucose 1 each 0    busPIRone (Buspar) 10 mg tablet Take 2 tablets (20 mg) by mouth 2 times a day. 360 tablet 1    clindamycin (Cleocin T) 1 % gel Apply topically 2 times a day. apply to affected area 30 g 1    dulaglutide (Trulicity) 1.5 mg/0.5 mL pen injector injection Inject 1.5 mg under the skin every 7 days. 2 mL 3    gabapentin (Neurontin) 800 mg tablet TAKE 1 TABLET (800 MG) BY MOUTH 4 TIMES A  tablet 0     lancets (Accu-Chek Fastclix Lancet Drum) misc Use to test glucose once daily 100 each 11    levothyroxine (Synthroid, Levoxyl) 137 mcg tablet TAKE 1 TABLET (137 MCG) BY MOUTH EARLY IN THE MORNING. 90 tablet 0    lisinopriL-hydrochlorothiazide 20-12.5 mg tablet Take 1 tablet by mouth once daily. 90 tablet 3    metoprolol succinate XL (Toprol-XL) 100 mg 24 hr tablet TAKE 1 TABLET IN THE MORNING AND TAKE 1/2 TABLET IN THE EVENING ORALLY EVERY DAY 90 DAYS 135 tablet 1    nitroglycerin (Nitrostat) 0.4 mg SL tablet Place 1 tablet (0.4 mg) under the tongue every 5 minutes if needed for chest pain. 30 tablet 5    omeprazole (PriLOSEC) 40 mg DR capsule Take 1 capsule (40 mg) by mouth once daily in the morning. Take before meals. 30 MINUTES BEFORE MORNING MEAL FOR 30 DAYS Orally Once a day for 30 days      oxyCODONE-acetaminophen (Percocet) 7.5-325 mg tablet Take 1 tablet by mouth every 6 hours if needed for severe pain (7 - 10).      spironolactone (Aldactone) 50 mg tablet Take 1 tablet (50 mg) by mouth 2 times a day. 60 tablet 11    tiotropium (Spiriva Respimat) 1.25 mcg/actuation inhaler Inhale 2 puffs once daily. 8 g 11    tiZANidine (Zanaflex) 4 mg tablet Take 1 tablet (4 mg) by mouth 3 times a day as needed.      [DISCONTINUED] levothyroxine (Synthroid, Levoxyl) 137 mcg tablet Take 1 tablet (137 mcg) by mouth early in the morning.. 30 tablet 11     No current facility-administered medications on file prior to visit.   [2]   Allergies  Allergen Reactions    Corticosteroids (Glucocorticoids) Hives, Itching and Shortness of breath     Pt does not know which steroid she was taking.    Levofloxacin Rash     Red kyra syndrome    Penicillins Shortness of breath    Hydrocodone-Acetaminophen Hives    Methylprednisolone Hives and Nausea/vomiting     States she takes benadryl with it and it works    Promethazine Hives    Vancomycin Hcl Hives    Morphine Hives and Rash

## 2025-07-24 NOTE — Clinical Note
Patient came into the office I work at and we went over the use of her Jerrell 3 CGM. Placed first sensor, she is using her phone to monitor her readings. We also reviewed use of the regular glucometer and supplies and when to use to verify CGM. We have a follow up call in August to review the Jerrell readings. Let me know if any questions!

## 2025-07-25 ENCOUNTER — APPOINTMENT (OUTPATIENT)
Dept: RADIOLOGY | Facility: HOSPITAL | Age: 50
End: 2025-07-25
Payer: COMMERCIAL

## 2025-07-25 ENCOUNTER — HOSPITAL ENCOUNTER (EMERGENCY)
Facility: HOSPITAL | Age: 50
Discharge: HOME | End: 2025-07-25
Payer: COMMERCIAL

## 2025-07-25 VITALS
WEIGHT: 292 LBS | HEIGHT: 72 IN | TEMPERATURE: 98.2 F | BODY MASS INDEX: 39.55 KG/M2 | OXYGEN SATURATION: 98 % | DIASTOLIC BLOOD PRESSURE: 83 MMHG | SYSTOLIC BLOOD PRESSURE: 146 MMHG | HEART RATE: 95 BPM | RESPIRATION RATE: 16 BRPM

## 2025-07-25 DIAGNOSIS — Z00.00 NORMAL EXAM: Primary | ICD-10-CM

## 2025-07-25 LAB — GLUCOSE BLD MANUAL STRIP-MCNC: 166 MG/DL (ref 74–99)

## 2025-07-25 PROCEDURE — 73060 X-RAY EXAM OF HUMERUS: CPT | Mod: LEFT SIDE | Performed by: RADIOLOGY

## 2025-07-25 PROCEDURE — 99283 EMERGENCY DEPT VISIT LOW MDM: CPT

## 2025-07-25 PROCEDURE — 73060 X-RAY EXAM OF HUMERUS: CPT | Mod: LT

## 2025-07-25 PROCEDURE — 82947 ASSAY GLUCOSE BLOOD QUANT: CPT

## 2025-07-25 NOTE — ED NOTES
Went over discharge instructions with pt. Pt verbalized understanding. No further issues at time of discharge.     Ricky Carr RN  07/25/25 [id number]

## 2025-07-28 NOTE — ED PROVIDER NOTES
Emergency Department Provider Note       History of Present Illness     History provided by: Patient  Limitations to History: None  External Records Reviewed with Brief Summary: None    HPI:  Janae Roman is a 49 y.o. female presents for possible foreign body.  She just got a glucose monitoring device for the first time and when she took it out, she is worried that the needle still in her skin.  Her significant other felt like they could see it.  She otherwise denies any other complaints    Physical Exam   Triage vitals:  T 36.8 °C (98.2 °F)  HR 95  /83  RR 16  O2 98 % None (Room air)    Physical Exam  Constitutional:       Appearance: Normal appearance. She is obese.   HENT:      Head: Normocephalic and atraumatic.      Mouth/Throat:      Mouth: Mucous membranes are moist.     Eyes:      Conjunctiva/sclera: Conjunctivae normal.       Musculoskeletal:         General: No swelling, tenderness, deformity or signs of injury. Normal range of motion.      Cervical back: Normal range of motion and neck supple.      Right lower leg: No edema.     Skin:     General: Skin is warm and dry.      Capillary Refill: Capillary refill takes less than 2 seconds.      Comments: I can see no foreign body in her left arm, I can see where the adhesive was as well as the previous puncture site     Neurological:      Mental Status: She is alert.           Medical Decision Making & ED Course   Medical Decision Makin y.o. female presents to rule out foreign body.  He has significant physical exam however will obtain plain film imaging as the needle should be very radiopaque.    X-rays reviewed myself, no foreign body noted.    Discussed negative x-rays and patient relieved.  I did check her glucose for her as well as she does not want to put on the new Gluco    She did request I check her glucose for her as she does not want to place the new monitoring device on until morning. It is 166.     Discussed continued use of  glucose monitoring device and patient discharged.   ----      Differential diagnoses considered include but are not limited to: FB     Social Determinants of Health which Significantly Impact Care: Social Determinants of Health which Significantly Impact Care: None identified     EKG Independent Interpretation: EKG not obtained    Independent Result Review and Interpretation: Relevant laboratory and radiographic results were reviewed and independently interpreted by myself.  As necessary, they are commented on in the ED Course.    Chronic conditions affecting the patient's care: As documented above in [facility] [place]    The patient was discussed with the following consultants/services: None    Care Considerations: As documented above in [facility] [place]    ED Course:  Diagnoses as of 07/28/25 1327   Normal exam       Disposition   As a result of the work-up, the patient was discharged home.  she was informed of her diagnosis and instructed to come back with any concerns or worsening of condition.  she and was agreeable to the plan as discussed above.  she was given the opportunity to ask questions.  All of the patient's questions were answered.    Procedures   Procedures        Parveen Mendoza MD  Emergency Medicine                                                       Parveen Mendoza MD  07/28/25 0902

## 2025-08-04 ENCOUNTER — HOSPITAL ENCOUNTER (EMERGENCY)
Facility: HOSPITAL | Age: 50
Discharge: HOME | End: 2025-08-04
Attending: EMERGENCY MEDICINE
Payer: COMMERCIAL

## 2025-08-04 VITALS
RESPIRATION RATE: 18 BRPM | SYSTOLIC BLOOD PRESSURE: 123 MMHG | HEIGHT: 72 IN | DIASTOLIC BLOOD PRESSURE: 68 MMHG | OXYGEN SATURATION: 96 % | WEIGHT: 290 LBS | TEMPERATURE: 97 F | BODY MASS INDEX: 39.28 KG/M2 | HEART RATE: 85 BPM

## 2025-08-04 DIAGNOSIS — M54.50 ACUTE EXACERBATION OF CHRONIC LOW BACK PAIN: Primary | ICD-10-CM

## 2025-08-04 DIAGNOSIS — G89.29 ACUTE EXACERBATION OF CHRONIC LOW BACK PAIN: Primary | ICD-10-CM

## 2025-08-04 PROCEDURE — 2500000004 HC RX 250 GENERAL PHARMACY W/ HCPCS (ALT 636 FOR OP/ED): Mod: JZ | Performed by: EMERGENCY MEDICINE

## 2025-08-04 PROCEDURE — 99284 EMERGENCY DEPT VISIT MOD MDM: CPT | Mod: 25 | Performed by: EMERGENCY MEDICINE

## 2025-08-04 PROCEDURE — 96374 THER/PROPH/DIAG INJ IV PUSH: CPT

## 2025-08-04 PROCEDURE — 96372 THER/PROPH/DIAG INJ SC/IM: CPT | Performed by: EMERGENCY MEDICINE

## 2025-08-04 RX ORDER — KETOROLAC TROMETHAMINE 15 MG/ML
15 INJECTION, SOLUTION INTRAMUSCULAR; INTRAVENOUS ONCE
Status: COMPLETED | OUTPATIENT
Start: 2025-08-04 | End: 2025-08-04

## 2025-08-04 RX ORDER — HYDROMORPHONE HYDROCHLORIDE 1 MG/ML
1 INJECTION, SOLUTION INTRAMUSCULAR; INTRAVENOUS; SUBCUTANEOUS ONCE
Status: COMPLETED | OUTPATIENT
Start: 2025-08-04 | End: 2025-08-04

## 2025-08-04 RX ORDER — ORPHENADRINE CITRATE 30 MG/ML
60 INJECTION INTRAMUSCULAR; INTRAVENOUS ONCE
Status: COMPLETED | OUTPATIENT
Start: 2025-08-04 | End: 2025-08-04

## 2025-08-04 RX ADMIN — HYDROMORPHONE HYDROCHLORIDE 1 MG: 1 INJECTION, SOLUTION INTRAMUSCULAR; INTRAVENOUS; SUBCUTANEOUS at 01:43

## 2025-08-04 RX ADMIN — KETOROLAC TROMETHAMINE 15 MG: 15 INJECTION, SOLUTION INTRAMUSCULAR; INTRAVENOUS at 01:43

## 2025-08-04 RX ADMIN — ORPHENADRINE CITRATE 60 MG: 60 INJECTION INTRAMUSCULAR; INTRAVENOUS at 01:43

## 2025-08-04 ASSESSMENT — PAIN DESCRIPTION - PAIN TYPE: TYPE: ACUTE PAIN

## 2025-08-04 ASSESSMENT — PAIN DESCRIPTION - LOCATION
LOCATION: BACK
LOCATION: BACK

## 2025-08-04 ASSESSMENT — PAIN DESCRIPTION - ORIENTATION: ORIENTATION: LOWER;RIGHT

## 2025-08-04 ASSESSMENT — PAIN - FUNCTIONAL ASSESSMENT
PAIN_FUNCTIONAL_ASSESSMENT: 0-10
PAIN_FUNCTIONAL_ASSESSMENT: 0-10

## 2025-08-04 ASSESSMENT — PAIN SCALES - GENERAL
PAINLEVEL_OUTOF10: 9
PAINLEVEL_OUTOF10: 5 - MODERATE PAIN
PAINLEVEL_OUTOF10: 9

## 2025-08-04 NOTE — ED PROVIDER NOTES
EMERGENCY DEPARTMENT ENCOUNTER      Pt Name: Janae Roman  MRN: [id number]  Birthdate 1975  Date of evaluation: 8/4/2025  ED Provider: Leanne Prasad DO     CHIEF COMPLAINT       Chief Complaint   Patient presents with    Back Pain       HISTORY OF PRESENT ILLNESS    Janae Roman is a 50 y.o. who presents to the emergency department via private vehicle with complaint of acute on chronic back pain.  She has a history of chronic back pain for which she is on Percocet, tizanidine, gabapentin.  She states that she has chronic neuropathy in her legs and feet.  Shortly prior to arrival she was having her hands on her bed and leaned forward to grab something.  She felt a pain in the right paraspinal area that radiated down her right leg.  She tried lidocaine which did not help and currently has capsaicin patches on the area.  She feels as though the right paraspinals are spasming.  Wraps around to her right posterior iliac crest.  She states that it hurts to bear weight.    REVIEW OF SYSTEMS     Focused ROS performed and negative other than as listed in HPI    PAST MEDICAL HISTORY   Medical History[1]    SURGICAL HISTORY     Surgical History[2]    CURRENT MEDICATIONS       Discharge Medication List as of 8/4/2025  2:58 AM        CONTINUE these medications which have NOT CHANGED    Details   albuterol 2.5 mg /3 mL (0.083 %) nebulizer solution Take 3 mL (2.5 mg) by nebulization every 6 hours if needed for wheezing., Starting Sat 1/4/2025, Until Thu 7/24/2025 at 2359, Normal      amitriptyline (Elavil) 10 mg tablet Take 1-2 tablets (10-20 mg) by mouth 2 times a day. 1 tablet in the morning / 2 tablet at night, Historical Med      aspirin 81 mg EC tablet Take 1 tablet (81 mg) by mouth once daily., Historical Med      atorvastatin (Lipitor) 80 mg tablet TAKE 1 TABLET BY MOUTH EVERY DAY, Starting Mon 2/24/2025, Normal      blood sugar diagnostic (Accu-Chek Guide test strips) Use to test glucose once daily, Normal       blood-glucose meter (Accu-Chek Guide Glucose Meter) misc Use as directed to test glucose once daily, Normal      blood-glucose sensor (FreeStyle Jerrell 3 Plus Sensor) device Use to monitor glucose, change every 15 days, Normal      blood-glucose,,cont (FreeStyle Jerrell 3 [place]) misc Use as instructed to monitor glucose, Normal      busPIRone (Buspar) 10 mg tablet Take 2 tablets (20 mg) by mouth 2 times a day., Starting Tue 6/10/2025, Normal      clindamycin (Cleocin T) 1 % gel Apply topically 2 times a day. apply to affected area, Starting Mon 10/21/2024, Until Tue 10/21/2025, Normal      dulaglutide (Trulicity) 1.5 mg/0.5 mL pen injector injection Inject 1.5 mg under the skin every 7 days., Starting Tue 7/8/2025, Normal      gabapentin (Neurontin) 800 mg tablet TAKE 1 TABLET (800 MG) BY MOUTH 4 TIMES A DAY, Normal      lancets (Accu-Chek Fastclix Lancet Drum) misc Use to test glucose once daily, Normal      levothyroxine (Synthroid, Levoxyl) 137 mcg tablet TAKE 1 TABLET (137 MCG) BY MOUTH EARLY IN THE MORNING., Starting Tue 7/22/2025, Until Wed 7/22/2026, Normal      lisinopriL-hydrochlorothiazide 20-12.5 mg tablet Take 1 tablet by mouth once daily., Starting Mon 3/17/2025, Until Tue 3/17/2026, Normal      metoprolol succinate XL (Toprol-XL) 100 mg 24 hr tablet TAKE 1 TABLET IN THE MORNING AND TAKE 1/2 TABLET IN THE EVENING ORALLY EVERY DAY 90 DAYS, Normal      nitroglycerin (Nitrostat) 0.4 mg SL tablet Place 1 tablet (0.4 mg) under the tongue every 5 minutes if needed for chest pain., Starting Tue 6/10/2025, Normal      omeprazole (PriLOSEC) 40 mg DR capsule Take 1 capsule (40 mg) by mouth once daily in the morning. Take before meals. 30 MINUTES BEFORE MORNING MEAL FOR 30 DAYS Orally Once a day for 30 days, Historical Med      oxyCODONE-acetaminophen (Percocet) 7.5-325 mg tablet Take 1 tablet by mouth every 6 hours if needed for severe pain (7 - 10)., Starting Sat 1/13/2024, Historical Med       spironolactone (Aldactone) 50 mg tablet Take 1 tablet (50 mg) by mouth 2 times a day., Starting Mon 10/21/2024, Until Tue 10/21/2025, Normal      tiotropium (Spiriva Respimat) 1.25 mcg/actuation inhaler Inhale 2 puffs once daily., Starting Mon 2/24/2025, Until Tue 2/24/2026, Normal      tiZANidine (Zanaflex) 4 mg tablet Take 1 tablet (4 mg) by mouth 3 times a day as needed., Historical Med             ALLERGIES     Corticosteroids (glucocorticoids), Levofloxacin, Penicillins, Hydrocodone-acetaminophen, Methylprednisolone, Promethazine, Vancomycin hcl, and Morphine    FAMILY HISTORY     Family History[3]     SOCIAL HISTORY     Social History[4]    PHYSICAL EXAM       ED Triage Vitals [08/04/25 0107]   Temperature Heart Rate Respirations BP   36.1 °C (97 °F) 94 20 109/67      Pulse Ox Temp src Heart Rate Source Patient Position   96 % -- -- --      BP Location FiO2 (%)     -- --        General: Appears chronically ill-appearing, uncomfortable but in no acute distress, alert  Head: Head atraumatic; normocephalic  Eyes: normal inspection; no icterus  ENT: mucosa moist without lesion  Neck: Normal inspection, no meningeal signs  Resp: Normal breath sounds, no wheeze or crackles; No respiratory distress  Chest Wall: no tenderness or deformity  Heart: Heart rate and rhythm regular; No Murmurs  Abdomen: Soft, Non-tender; No distention, guarding, rigidity, or rebound  MSK: no midline spinal tenderness, unable to obtain patellar reflexes as patient is unable to relax, Achilles reflexes 2+, sensation intact dermatomes L2 through S1 bilaterally, strength intact to hip flexors/extensors, knee flexors/extensors, plantar/dorsiflexion bilaterally; positive tenderness to the right paraspinals and right quadratus lumborum  Neuro: Alert; no focal deficits, moves all extremities  Psych: Mood and Affect normal  Skin: Color appropriate; warm; Dry    EMERGENCY DEPARTMENT COURSE/MDM   Patient presents with chronic back pain.  This does  appear to be muscular in nature.  She does not have any acute trauma or concerning symptoms warranting advanced imaging.  Will trial muscle relaxers and pain medicine.  Patient is agreeable with this plan of care.    ED Course as of 08/04/25 0419   Mon Aug 04, 2025   0257 Patient is feeling much improved at this time.  She is sitting up and much more comfortable.  She is requesting discharge home.  She is to continue her home pain medications as previously scheduled and return if symptoms change or worsen in any way.  She verbalized understand agrees with plan of discharge.  Discharged in stable condition. [EF]      ED Course User Index  [EF] Leanne Prasad, DO         Diagnoses as of 08/04/25 0419   Acute exacerbation of chronic low back pain       Meds Administered:  Medications   ketorolac (Toradol) injection 15 mg (15 mg intramuscular Given 8/4/25 0143)   HYDROmorphone (Dilaudid) injection 1 mg (1 mg intravenous Given 8/4/25 0143)   orphenadrine (Norflex) injection 60 mg (60 mg intramuscular Given 8/4/25 0143)       PROCEDURES   Unless otherwise noted below, none  Procedures      FINAL IMPRESSION      1. Acute exacerbation of chronic low back pain          DISPOSITION    Discharge 08/04/2025 02:58:15 AM  As a result of the work-up, the patient was discharged home.  she was informed of her diagnosis and instructed to come back with any concerns or worsening of condition.  she and was agreeable to the plan as discussed above.  she was given the opportunity to ask questions.  All of the patient's questions were answered.    Critical Care time: Not Indicated    (Comment: Please note this report has been produced using speech recognition software and may contain errors related to that system including errors in grammar, punctuation, and spelling, as well as words and phrases that may be inappropriate.  If there are any questions or concerns please feel free to contact the dictating provider for clarification.)    Leanne  FELICITAS Prasad DO, MPH (electronically signed)  Emergency Medicine Physician       [1]   Past Medical History:  Diagnosis Date    Acute hypotension 2024    Acute non-ST elevation myocardial infarction (NSTEMI) (Multi) 2024    Calculus of kidney 2024    Fracture of tooth 2024    Infected sebaceous cyst 2024    Lactic acidosis 2024    Personal history of other complications of pregnancy, childbirth and the puerperium     History of spontaneous     Personal history of urinary calculi 2016    History of renal calculi    Toxic encephalopathy 2024   [2]   Past Surgical History:  Procedure Laterality Date    LITHOTRIPSY  2016    Renal Lithotripsy   [3]   Family History  Problem Relation Name Age of Onset    Other (bypass) Father      Heart disease Brother      Breast cancer Daughter          dion    Breast cancer Daughter     [4]   Social History  Socioeconomic History    Marital status:    Tobacco Use    Smoking status: Some Days     Current packs/day: 0.50     Types: Cigarettes    Smokeless tobacco: Never   Substance and Sexual Activity    Alcohol use: Never    Drug use: Never     Social Drivers of Health     Financial Resource Strain: Low Risk  (2024)    Overall Financial Resource Strain (CARDIA)     Difficulty of Paying Living Expenses: Not very hard   Food Insecurity: No Food Insecurity (2024)    Hunger Vital Sign     Worried About Running Out of Food in the Last Year: Never true     Ran Out of Food in the Last Year: Never true   Transportation Needs: No Transportation Needs (2024)    PRAPARE - Transportation     Lack of Transportation (Medical): No     Lack of Transportation (Non-Medical): No   Physical Activity: Inactive (2024)    Exercise Vital Sign     Days of Exercise per Week: 0 days     Minutes of Exercise per Session: 0 min   Stress: Stress Concern Present (2024)    [employer] [place] [employer] - Occupational  Stress Questionnaire     Feeling of Stress : Rather much   Social Connections: Unknown (8/20/2024)    Social Connection and Isolation Panel     Frequency of Communication with Friends and Family: More than three times a week     Frequency of Social Gatherings with Friends and Family: Twice a week     Marital Status: Living with partner   Housing Stability: Low Risk  (8/20/2024)    Housing Stability Vital Sign     Unable to Pay for Housing in the Last Year: No     Number of Times Moved in the Last Year: 1     Homeless in the Last Year: No        Leanne Prasad DO  08/04/25 0420

## 2025-08-04 NOTE — ED TRIAGE NOTES
Pt to ED with chief complaint of left lower back pain that radiates into leg. Pt states she went down to reach a shirt off of her bed today when all of a sudden the pain started again. Pt also complaining of inability to ambulate due to pain. Pt was able to pivot from wheelchair to chair with cane. Pt reports hx of spinal fusion.

## 2025-08-04 NOTE — DISCHARGE INSTRUCTIONS
Thank you for allowing us to take care of you today. While you are home, you might receive a survey about your care in our hospital. Your nurse and myself, Dr Prasad, would love your honest feedback about your experience. Your feedback and especially your positive comments help our hospital receive the support we need to continue to serve you and your family. Thank you again for trusting us with your care

## 2025-08-11 ENCOUNTER — PHARMACY VISIT (OUTPATIENT)
Dept: PHARMACY | Facility: CLINIC | Age: 50
End: 2025-08-11
Payer: MEDICAID

## 2025-08-11 PROCEDURE — RXMED WILLOW AMBULATORY MEDICATION CHARGE

## 2025-08-22 ENCOUNTER — APPOINTMENT (OUTPATIENT)
Dept: PHARMACY | Facility: HOSPITAL | Age: 50
End: 2025-08-22
Payer: COMMERCIAL

## 2025-08-22 DIAGNOSIS — E11.9 TYPE 2 DIABETES MELLITUS WITHOUT COMPLICATION, WITHOUT LONG-TERM CURRENT USE OF INSULIN: ICD-10-CM

## 2025-08-25 ENCOUNTER — PHARMACY VISIT (OUTPATIENT)
Dept: PHARMACY | Facility: CLINIC | Age: 50
End: 2025-08-25
Payer: MEDICAID

## 2025-08-25 PROCEDURE — RXMED WILLOW AMBULATORY MEDICATION CHARGE

## 2025-09-10 ENCOUNTER — APPOINTMENT (OUTPATIENT)
Dept: PRIMARY CARE | Facility: CLINIC | Age: 50
End: 2025-09-10
Payer: COMMERCIAL

## 2025-09-26 ENCOUNTER — APPOINTMENT (OUTPATIENT)
Dept: PRIMARY CARE | Facility: CLINIC | Age: 50
End: 2025-09-26
Payer: COMMERCIAL

## 2025-10-29 ENCOUNTER — APPOINTMENT (OUTPATIENT)
Dept: PHARMACY | Facility: HOSPITAL | Age: 50
End: 2025-10-29
Payer: COMMERCIAL